# Patient Record
Sex: FEMALE | Race: WHITE | NOT HISPANIC OR LATINO | URBAN - METROPOLITAN AREA
[De-identification: names, ages, dates, MRNs, and addresses within clinical notes are randomized per-mention and may not be internally consistent; named-entity substitution may affect disease eponyms.]

---

## 2017-11-03 ENCOUNTER — EMERGENCY (EMERGENCY)
Facility: HOSPITAL | Age: 72
LOS: 1 days | Discharge: HOME | End: 2017-11-03
Attending: EMERGENCY MEDICINE | Admitting: EMERGENCY MEDICINE
Payer: MEDICARE

## 2017-11-03 VITALS
TEMPERATURE: 98 F | DIASTOLIC BLOOD PRESSURE: 76 MMHG | HEART RATE: 71 BPM | RESPIRATION RATE: 16 BRPM | SYSTOLIC BLOOD PRESSURE: 136 MMHG | OXYGEN SATURATION: 99 %

## 2017-11-03 VITALS
OXYGEN SATURATION: 99 % | TEMPERATURE: 98 F | RESPIRATION RATE: 18 BRPM | DIASTOLIC BLOOD PRESSURE: 84 MMHG | HEART RATE: 85 BPM | SYSTOLIC BLOOD PRESSURE: 147 MMHG

## 2017-11-03 DIAGNOSIS — Z90.49 ACQUIRED ABSENCE OF OTHER SPECIFIED PARTS OF DIGESTIVE TRACT: Chronic | ICD-10-CM

## 2017-11-03 DIAGNOSIS — Z98.890 OTHER SPECIFIED POSTPROCEDURAL STATES: Chronic | ICD-10-CM

## 2017-11-03 LAB
ALBUMIN SERPL ELPH-MCNC: 3.9 G/DL — SIGNIFICANT CHANGE UP (ref 3.4–5)
ALP SERPL-CCNC: 77 U/L — SIGNIFICANT CHANGE UP (ref 40–120)
ALT FLD-CCNC: 19 U/L — SIGNIFICANT CHANGE UP (ref 12–42)
ANION GAP SERPL CALC-SCNC: 7 MMOL/L — LOW (ref 9–16)
APPEARANCE UR: CLEAR — SIGNIFICANT CHANGE UP
AST SERPL-CCNC: 28 U/L — SIGNIFICANT CHANGE UP (ref 15–37)
BILIRUB SERPL-MCNC: 0.7 MG/DL — SIGNIFICANT CHANGE UP (ref 0.2–1.2)
BILIRUB UR-MCNC: NEGATIVE — SIGNIFICANT CHANGE UP
BUN SERPL-MCNC: 11 MG/DL — SIGNIFICANT CHANGE UP (ref 7–23)
CALCIUM SERPL-MCNC: 8.9 MG/DL — SIGNIFICANT CHANGE UP (ref 8.5–10.5)
CHLORIDE SERPL-SCNC: 104 MMOL/L — SIGNIFICANT CHANGE UP (ref 96–108)
CO2 SERPL-SCNC: 27 MMOL/L — SIGNIFICANT CHANGE UP (ref 22–31)
COLOR SPEC: YELLOW — SIGNIFICANT CHANGE UP
CREAT SERPL-MCNC: 0.73 MG/DL — SIGNIFICANT CHANGE UP (ref 0.5–1.3)
DIFF PNL FLD: (no result)
GLUCOSE SERPL-MCNC: 131 MG/DL — HIGH (ref 70–99)
GLUCOSE UR QL: NEGATIVE — SIGNIFICANT CHANGE UP
HCT VFR BLD CALC: 44 % — SIGNIFICANT CHANGE UP (ref 34.5–45)
HGB BLD-MCNC: 14.6 G/DL — SIGNIFICANT CHANGE UP (ref 11.5–15.5)
KETONES UR-MCNC: 15 MG/DL
LACTATE SERPL-SCNC: 1 MMOL/L — SIGNIFICANT CHANGE UP (ref 0.4–2)
LEUKOCYTE ESTERASE UR-ACNC: NEGATIVE — SIGNIFICANT CHANGE UP
MCHC RBC-ENTMCNC: 30 PG — SIGNIFICANT CHANGE UP (ref 27–34)
MCHC RBC-ENTMCNC: 33.2 G/DL — SIGNIFICANT CHANGE UP (ref 32–36)
MCV RBC AUTO: 90.3 FL — SIGNIFICANT CHANGE UP (ref 80–100)
NITRITE UR-MCNC: NEGATIVE — SIGNIFICANT CHANGE UP
PH UR: 6 — SIGNIFICANT CHANGE UP (ref 5–8)
PLATELET # BLD AUTO: 172 K/UL — SIGNIFICANT CHANGE UP (ref 150–400)
POTASSIUM SERPL-MCNC: 4.1 MMOL/L — SIGNIFICANT CHANGE UP (ref 3.5–5.3)
POTASSIUM SERPL-SCNC: 4.1 MMOL/L — SIGNIFICANT CHANGE UP (ref 3.5–5.3)
PROT SERPL-MCNC: 7.9 G/DL — SIGNIFICANT CHANGE UP (ref 6.4–8.2)
PROT UR-MCNC: (no result) MG/DL
RBC # BLD: 4.87 M/UL — SIGNIFICANT CHANGE UP (ref 3.8–5.2)
RBC # FLD: 13.4 % — SIGNIFICANT CHANGE UP (ref 10.3–16.9)
SODIUM SERPL-SCNC: 138 MMOL/L — SIGNIFICANT CHANGE UP (ref 132–145)
SP GR SPEC: >=1.03 — SIGNIFICANT CHANGE UP (ref 1–1.03)
UROBILINOGEN FLD QL: 0.2 E.U./DL — SIGNIFICANT CHANGE UP
WBC # BLD: 7.2 K/UL — SIGNIFICANT CHANGE UP (ref 3.8–10.5)
WBC # FLD AUTO: 7.2 K/UL — SIGNIFICANT CHANGE UP (ref 3.8–10.5)

## 2017-11-03 PROCEDURE — 99284 EMERGENCY DEPT VISIT MOD MDM: CPT

## 2017-11-03 PROCEDURE — 70450 CT HEAD/BRAIN W/O DYE: CPT | Mod: 26

## 2017-11-03 RX ORDER — KETOROLAC TROMETHAMINE 30 MG/ML
30 SYRINGE (ML) INJECTION ONCE
Qty: 0 | Refills: 0 | Status: DISCONTINUED | OUTPATIENT
Start: 2017-11-03 | End: 2017-11-03

## 2017-11-03 RX ORDER — SODIUM CHLORIDE 9 MG/ML
1000 INJECTION INTRAMUSCULAR; INTRAVENOUS; SUBCUTANEOUS ONCE
Qty: 0 | Refills: 0 | Status: COMPLETED | OUTPATIENT
Start: 2017-11-03 | End: 2017-11-03

## 2017-11-03 RX ORDER — ONDANSETRON 8 MG/1
4 TABLET, FILM COATED ORAL ONCE
Qty: 0 | Refills: 0 | Status: COMPLETED | OUTPATIENT
Start: 2017-11-03 | End: 2017-11-03

## 2017-11-03 RX ADMIN — SODIUM CHLORIDE 2000 MILLILITER(S): 9 INJECTION INTRAMUSCULAR; INTRAVENOUS; SUBCUTANEOUS at 18:41

## 2017-11-03 RX ADMIN — ONDANSETRON 4 MILLIGRAM(S): 8 TABLET, FILM COATED ORAL at 18:41

## 2017-11-03 RX ADMIN — Medication 30 MILLIGRAM(S): at 18:41

## 2017-11-03 NOTE — ED PROVIDER NOTE - OBJECTIVE STATEMENT
70 y/o F w/ PMH pancreatitis(presumed from Demerol use), sleep apnea and recurrent UTIs presents w/ headache that began last night with associated nausea. She states the headache came on abruptly and kept her up all night. Patient states she has a friend who had a headache that presented similarly which was due to a brain aneurysm and was concern, so patient comes in for evaluation. Patient also complains of non-bloody diarrhea with soft stool "for a while," that has been worsening. Denies fever, chills, chest pain, shortness of breath, abdominal pain, vomiting, neck pain, photophobia, loss of vision, weakness in extremities, difficulty speaking or word finding, unsteady gait. 70 y/o F w/ PMH pancreatitis(presumed from Demerol use), sleep apnea and recurrent UTIs presents w/ headache that began last night with associated nausea. She states the headache came on gradually and kept her up all night.  Patient also complains of non-bloody diarrhea with soft stool "for a while," that has been worsening. Denies fever, chills, chest pain, shortness of breath, abdominal pain, vomiting, neck pain, photophobia, loss of vision, weakness in extremities, difficulty speaking or word finding, unsteady gait.  Headache is not worst of life and did not come on suddenly or at peak intensity.  No neuro complaints.  NO recent travel/ill contacts.  No neck pain/stiffness.

## 2017-11-03 NOTE — ED PROVIDER NOTE - CHPI ED SYMPTOMS NEG
no change in level of consciousness/no fever/no blurred vision/no weakness/no loss of consciousness/no confusion/no vomiting

## 2017-11-03 NOTE — ED PROVIDER NOTE - NEUROLOGICAL, MLM
Alert and oriented, no focal deficits, no motor or sensory deficits. CNs intact. Normal Romberg. Normal finger to nose. No pronator drift. Normal rapi alternating movements/heal to shin. Sensation intact to all extremities. 5/5 strength to all extremities. Alert and oriented, no focal deficits, no motor or sensory deficits. CNs intact. Normal Romberg. Normal finger to nose. No pronator drift.  Sensation intact to all extremities. 5/5 strength to all extremities.

## 2017-11-03 NOTE — ED ADULT TRIAGE NOTE - CHIEF COMPLAINT QUOTE
Pt has diarrhea for the last week. States she also has a headache. Pt eating crackers in triage. Pt states she never gets headaches and her BP is higher then normal.

## 2017-11-03 NOTE — ED PROVIDER NOTE - PROGRESS NOTE DETAILS
Marked improvement in condition after fluids/medication.  No further headache, no dizziness, no nausea.  Patient is able to tolerate fluids/food without difficulty or nausea.  Neuro exam remains normal.  Patient states she feels better and is ready to go.  Issues with loose stool are longstanding - no fever, no blood in stool and no watery stool.  Patient has a gastroenterologist and will follow up there.

## 2017-11-09 DIAGNOSIS — R51 HEADACHE: ICD-10-CM

## 2017-11-09 DIAGNOSIS — R19.7 DIARRHEA, UNSPECIFIED: ICD-10-CM

## 2017-11-09 DIAGNOSIS — R11.0 NAUSEA: ICD-10-CM

## 2017-11-09 DIAGNOSIS — Z87.891 PERSONAL HISTORY OF NICOTINE DEPENDENCE: ICD-10-CM

## 2017-11-12 ENCOUNTER — TRANSCRIPTION ENCOUNTER (OUTPATIENT)
Age: 72
End: 2017-11-12

## 2018-01-10 ENCOUNTER — EMERGENCY (EMERGENCY)
Facility: HOSPITAL | Age: 73
LOS: 1 days | Discharge: HOME | End: 2018-01-10
Admitting: EMERGENCY MEDICINE
Payer: MEDICARE

## 2018-01-10 VITALS
RESPIRATION RATE: 18 BRPM | SYSTOLIC BLOOD PRESSURE: 125 MMHG | OXYGEN SATURATION: 100 % | HEART RATE: 75 BPM | DIASTOLIC BLOOD PRESSURE: 77 MMHG | TEMPERATURE: 98 F

## 2018-01-10 VITALS
DIASTOLIC BLOOD PRESSURE: 79 MMHG | OXYGEN SATURATION: 99 % | SYSTOLIC BLOOD PRESSURE: 127 MMHG | HEART RATE: 68 BPM | RESPIRATION RATE: 18 BRPM | TEMPERATURE: 98 F

## 2018-01-10 DIAGNOSIS — R10.9 UNSPECIFIED ABDOMINAL PAIN: ICD-10-CM

## 2018-01-10 DIAGNOSIS — R35.0 FREQUENCY OF MICTURITION: ICD-10-CM

## 2018-01-10 DIAGNOSIS — Z88.8 ALLERGY STATUS TO OTHER DRUGS, MEDICAMENTS AND BIOLOGICAL SUBSTANCES STATUS: ICD-10-CM

## 2018-01-10 DIAGNOSIS — Z98.890 OTHER SPECIFIED POSTPROCEDURAL STATES: Chronic | ICD-10-CM

## 2018-01-10 DIAGNOSIS — Z90.49 ACQUIRED ABSENCE OF OTHER SPECIFIED PARTS OF DIGESTIVE TRACT: Chronic | ICD-10-CM

## 2018-01-10 LAB
ALBUMIN SERPL ELPH-MCNC: 3.4 G/DL — SIGNIFICANT CHANGE UP (ref 3.4–5)
ALP SERPL-CCNC: 103 U/L — SIGNIFICANT CHANGE UP (ref 40–120)
ALT FLD-CCNC: 17 U/L — SIGNIFICANT CHANGE UP (ref 12–42)
ANION GAP SERPL CALC-SCNC: 7 MMOL/L — LOW (ref 9–16)
APPEARANCE UR: CLEAR — SIGNIFICANT CHANGE UP
AST SERPL-CCNC: 17 U/L — SIGNIFICANT CHANGE UP (ref 15–37)
BASOPHILS NFR BLD AUTO: 0.4 % — SIGNIFICANT CHANGE UP (ref 0–2)
BILIRUB SERPL-MCNC: 0.2 MG/DL — SIGNIFICANT CHANGE UP (ref 0.2–1.2)
BILIRUB UR-MCNC: NEGATIVE — SIGNIFICANT CHANGE UP
BUN SERPL-MCNC: 12 MG/DL — SIGNIFICANT CHANGE UP (ref 7–23)
CALCIUM SERPL-MCNC: 8.7 MG/DL — SIGNIFICANT CHANGE UP (ref 8.5–10.5)
CHLORIDE SERPL-SCNC: 104 MMOL/L — SIGNIFICANT CHANGE UP (ref 96–108)
CO2 SERPL-SCNC: 27 MMOL/L — SIGNIFICANT CHANGE UP (ref 22–31)
COLOR SPEC: YELLOW — SIGNIFICANT CHANGE UP
CREAT SERPL-MCNC: 1.01 MG/DL — SIGNIFICANT CHANGE UP (ref 0.5–1.3)
DIFF PNL FLD: (no result)
EOSINOPHIL NFR BLD AUTO: 4 % — SIGNIFICANT CHANGE UP (ref 0–6)
GLUCOSE SERPL-MCNC: 89 MG/DL — SIGNIFICANT CHANGE UP (ref 70–99)
GLUCOSE UR QL: NEGATIVE — SIGNIFICANT CHANGE UP
HCT VFR BLD CALC: 41.5 % — SIGNIFICANT CHANGE UP (ref 34.5–45)
HGB BLD-MCNC: 13.6 G/DL — SIGNIFICANT CHANGE UP (ref 11.5–15.5)
IMM GRANULOCYTES NFR BLD AUTO: 0.2 % — SIGNIFICANT CHANGE UP (ref 0–1.5)
KETONES UR-MCNC: NEGATIVE — SIGNIFICANT CHANGE UP
LEUKOCYTE ESTERASE UR-ACNC: NEGATIVE — SIGNIFICANT CHANGE UP
LYMPHOCYTES # BLD AUTO: 44.5 % — HIGH (ref 13–44)
MAGNESIUM SERPL-MCNC: 2.1 MG/DL — SIGNIFICANT CHANGE UP (ref 1.6–2.6)
MCHC RBC-ENTMCNC: 29.6 PG — SIGNIFICANT CHANGE UP (ref 27–34)
MCHC RBC-ENTMCNC: 32.8 G/DL — SIGNIFICANT CHANGE UP (ref 32–36)
MCV RBC AUTO: 90.2 FL — SIGNIFICANT CHANGE UP (ref 80–100)
MONOCYTES NFR BLD AUTO: 10.9 % — SIGNIFICANT CHANGE UP (ref 2–14)
NEUTROPHILS NFR BLD AUTO: 40 % — LOW (ref 43–77)
NITRITE UR-MCNC: NEGATIVE — SIGNIFICANT CHANGE UP
PH UR: 6 — SIGNIFICANT CHANGE UP (ref 5–8)
PLATELET # BLD AUTO: 171 K/UL — SIGNIFICANT CHANGE UP (ref 150–400)
POTASSIUM SERPL-MCNC: 4.4 MMOL/L — SIGNIFICANT CHANGE UP (ref 3.5–5.3)
POTASSIUM SERPL-SCNC: 4.4 MMOL/L — SIGNIFICANT CHANGE UP (ref 3.5–5.3)
PROT SERPL-MCNC: 7.3 G/DL — SIGNIFICANT CHANGE UP (ref 6.4–8.2)
PROT UR-MCNC: NEGATIVE MG/DL — SIGNIFICANT CHANGE UP
RBC # BLD: 4.6 M/UL — SIGNIFICANT CHANGE UP (ref 3.8–5.2)
RBC # FLD: 13.6 % — SIGNIFICANT CHANGE UP (ref 10.3–16.9)
SODIUM SERPL-SCNC: 138 MMOL/L — SIGNIFICANT CHANGE UP (ref 132–145)
SP GR SPEC: 1.02 — SIGNIFICANT CHANGE UP (ref 1–1.03)
UROBILINOGEN FLD QL: 0.2 E.U./DL — SIGNIFICANT CHANGE UP
WBC # BLD: 4.7 K/UL — SIGNIFICANT CHANGE UP (ref 3.8–10.5)
WBC # FLD AUTO: 4.7 K/UL — SIGNIFICANT CHANGE UP (ref 3.8–10.5)

## 2018-01-10 PROCEDURE — 74176 CT ABD & PELVIS W/O CONTRAST: CPT | Mod: 26

## 2018-01-10 PROCEDURE — 71046 X-RAY EXAM CHEST 2 VIEWS: CPT | Mod: 26

## 2018-01-10 PROCEDURE — 99284 EMERGENCY DEPT VISIT MOD MDM: CPT

## 2018-01-10 RX ORDER — SODIUM CHLORIDE 9 MG/ML
1000 INJECTION INTRAMUSCULAR; INTRAVENOUS; SUBCUTANEOUS ONCE
Qty: 0 | Refills: 0 | Status: COMPLETED | OUTPATIENT
Start: 2018-01-10 | End: 2018-01-10

## 2018-01-10 RX ADMIN — SODIUM CHLORIDE 2000 MILLILITER(S): 9 INJECTION INTRAMUSCULAR; INTRAVENOUS; SUBCUTANEOUS at 21:27

## 2018-01-10 NOTE — ED ADULT TRIAGE NOTE - CHIEF COMPLAINT QUOTE
bilateral flank pain radiating towards the back. states "my kidney infection is getting worst"  Patient coughing in triage. but refused to wear a mask.

## 2018-01-10 NOTE — ED PROVIDER NOTE - MEDICAL DECISION MAKING DETAILS
flank pain with intermittent urinary symptoms, VSS and pt afebrile. currently on day 2 of bactrim.  ct shows no renal abnormalities, and labs otherwise unremarkable.  advised to finish bactrim; pmd and urology f/u. also noted on ct were lumbar spine age appropriate changes and pt with non focal exam but possible also contributing to back pain.  return precautions discussed

## 2018-01-10 NOTE — ED PROVIDER NOTE - OBJECTIVE STATEMENT
Pt is 73 yo female with pmhx of pancreatitis, sleep apnea, UTI's, and hx of appendectomy presents with bilateral flank pain x 3-4 days.  Pt with recent uti and treatment on and off several different antibiotics over the past month with conflicting info from the urgent care where she was treated.  Pt reports at one point had 1 day of hematuria >1 week ago and also had some urinary frequency and mild "foggy feeling" which she has associated as a symptom of her utis in the past.  Pt denies any fevers, chills, n/v/d, abdominal pain.  Pt with mild cough x 1 week.

## 2018-01-13 LAB
-  AMPICILLIN: SIGNIFICANT CHANGE UP
-  CIPROFLOXACIN: SIGNIFICANT CHANGE UP
-  NITROFURANTOIN: SIGNIFICANT CHANGE UP
-  TETRACYCLINE: SIGNIFICANT CHANGE UP
-  VANCOMYCIN: SIGNIFICANT CHANGE UP
CULTURE RESULTS: SIGNIFICANT CHANGE UP
METHOD TYPE: SIGNIFICANT CHANGE UP
ORGANISM # SPEC MICROSCOPIC CNT: SIGNIFICANT CHANGE UP
ORGANISM # SPEC MICROSCOPIC CNT: SIGNIFICANT CHANGE UP
SPECIMEN SOURCE: SIGNIFICANT CHANGE UP

## 2018-01-28 ENCOUNTER — EMERGENCY (EMERGENCY)
Facility: HOSPITAL | Age: 73
LOS: 1 days | Discharge: HOME | End: 2018-01-28
Admitting: EMERGENCY MEDICINE
Payer: MEDICARE

## 2018-01-28 VITALS
SYSTOLIC BLOOD PRESSURE: 137 MMHG | RESPIRATION RATE: 18 BRPM | DIASTOLIC BLOOD PRESSURE: 75 MMHG | HEART RATE: 93 BPM | TEMPERATURE: 98 F | OXYGEN SATURATION: 97 %

## 2018-01-28 VITALS
DIASTOLIC BLOOD PRESSURE: 67 MMHG | HEART RATE: 90 BPM | RESPIRATION RATE: 16 BRPM | OXYGEN SATURATION: 98 % | SYSTOLIC BLOOD PRESSURE: 131 MMHG

## 2018-01-28 DIAGNOSIS — Z98.890 OTHER SPECIFIED POSTPROCEDURAL STATES: Chronic | ICD-10-CM

## 2018-01-28 DIAGNOSIS — Z90.49 ACQUIRED ABSENCE OF OTHER SPECIFIED PARTS OF DIGESTIVE TRACT: Chronic | ICD-10-CM

## 2018-01-28 LAB
ALBUMIN SERPL ELPH-MCNC: 3.8 G/DL — SIGNIFICANT CHANGE UP (ref 3.4–5)
ALP SERPL-CCNC: 83 U/L — SIGNIFICANT CHANGE UP (ref 40–120)
ALT FLD-CCNC: 20 U/L — SIGNIFICANT CHANGE UP (ref 12–42)
ANION GAP SERPL CALC-SCNC: 8 MMOL/L — LOW (ref 9–16)
APPEARANCE UR: CLEAR — SIGNIFICANT CHANGE UP
AST SERPL-CCNC: 21 U/L — SIGNIFICANT CHANGE UP (ref 15–37)
BASOPHILS NFR BLD AUTO: 0.3 % — SIGNIFICANT CHANGE UP (ref 0–2)
BILIRUB SERPL-MCNC: 0.5 MG/DL — SIGNIFICANT CHANGE UP (ref 0.2–1.2)
BILIRUB UR-MCNC: NEGATIVE — SIGNIFICANT CHANGE UP
BUN SERPL-MCNC: 13 MG/DL — SIGNIFICANT CHANGE UP (ref 7–23)
CALCIUM SERPL-MCNC: 8.8 MG/DL — SIGNIFICANT CHANGE UP (ref 8.5–10.5)
CHLORIDE SERPL-SCNC: 104 MMOL/L — SIGNIFICANT CHANGE UP (ref 96–108)
CO2 SERPL-SCNC: 27 MMOL/L — SIGNIFICANT CHANGE UP (ref 22–31)
COLOR SPEC: YELLOW — SIGNIFICANT CHANGE UP
CREAT SERPL-MCNC: 0.95 MG/DL — SIGNIFICANT CHANGE UP (ref 0.5–1.3)
DIFF PNL FLD: (no result)
EOSINOPHIL NFR BLD AUTO: 1.5 % — SIGNIFICANT CHANGE UP (ref 0–6)
GLUCOSE SERPL-MCNC: 114 MG/DL — HIGH (ref 70–99)
GLUCOSE UR QL: NEGATIVE — SIGNIFICANT CHANGE UP
HCT VFR BLD CALC: 42.4 % — SIGNIFICANT CHANGE UP (ref 34.5–45)
HGB BLD-MCNC: 14.1 G/DL — SIGNIFICANT CHANGE UP (ref 11.5–15.5)
IMM GRANULOCYTES NFR BLD AUTO: 0.3 % — SIGNIFICANT CHANGE UP (ref 0–1.5)
KETONES UR-MCNC: 40 MG/DL
LEUKOCYTE ESTERASE UR-ACNC: (no result)
LIDOCAIN IGE QN: 288 U/L — SIGNIFICANT CHANGE UP (ref 73–393)
LYMPHOCYTES # BLD AUTO: 11.3 % — LOW (ref 13–44)
MAGNESIUM SERPL-MCNC: 1.9 MG/DL — SIGNIFICANT CHANGE UP (ref 1.6–2.6)
MCHC RBC-ENTMCNC: 29.8 PG — SIGNIFICANT CHANGE UP (ref 27–34)
MCHC RBC-ENTMCNC: 33.3 G/DL — SIGNIFICANT CHANGE UP (ref 32–36)
MCV RBC AUTO: 89.6 FL — SIGNIFICANT CHANGE UP (ref 80–100)
MONOCYTES NFR BLD AUTO: 5.4 % — SIGNIFICANT CHANGE UP (ref 2–14)
NEUTROPHILS NFR BLD AUTO: 81.2 % — HIGH (ref 43–77)
NITRITE UR-MCNC: NEGATIVE — SIGNIFICANT CHANGE UP
PH UR: 5.5 — SIGNIFICANT CHANGE UP (ref 5–8)
PLATELET # BLD AUTO: 189 K/UL — SIGNIFICANT CHANGE UP (ref 150–400)
POTASSIUM SERPL-MCNC: 4 MMOL/L — SIGNIFICANT CHANGE UP (ref 3.5–5.3)
POTASSIUM SERPL-SCNC: 4 MMOL/L — SIGNIFICANT CHANGE UP (ref 3.5–5.3)
PROT SERPL-MCNC: 7.3 G/DL — SIGNIFICANT CHANGE UP (ref 6.4–8.2)
PROT UR-MCNC: NEGATIVE MG/DL — SIGNIFICANT CHANGE UP
RBC # BLD: 4.73 M/UL — SIGNIFICANT CHANGE UP (ref 3.8–5.2)
RBC # FLD: 13.4 % — SIGNIFICANT CHANGE UP (ref 10.3–16.9)
SODIUM SERPL-SCNC: 139 MMOL/L — SIGNIFICANT CHANGE UP (ref 132–145)
SP GR SPEC: 1.01 — SIGNIFICANT CHANGE UP (ref 1–1.03)
UROBILINOGEN FLD QL: 0.2 E.U./DL — SIGNIFICANT CHANGE UP
WBC # BLD: 7.8 K/UL — SIGNIFICANT CHANGE UP (ref 3.8–10.5)
WBC # FLD AUTO: 7.8 K/UL — SIGNIFICANT CHANGE UP (ref 3.8–10.5)

## 2018-01-28 PROCEDURE — 99284 EMERGENCY DEPT VISIT MOD MDM: CPT

## 2018-01-28 RX ORDER — ONDANSETRON 8 MG/1
8 TABLET, FILM COATED ORAL ONCE
Qty: 0 | Refills: 0 | Status: COMPLETED | OUTPATIENT
Start: 2018-01-28 | End: 2018-01-28

## 2018-01-28 RX ORDER — TRAMADOL HYDROCHLORIDE 50 MG/1
50 TABLET ORAL ONCE
Qty: 0 | Refills: 0 | Status: DISCONTINUED | OUTPATIENT
Start: 2018-01-28 | End: 2018-01-28

## 2018-01-28 RX ORDER — LOPERAMIDE HCL 2 MG
4 TABLET ORAL ONCE
Qty: 0 | Refills: 0 | Status: COMPLETED | OUTPATIENT
Start: 2018-01-28 | End: 2018-01-28

## 2018-01-28 RX ORDER — SODIUM CHLORIDE 9 MG/ML
1000 INJECTION INTRAMUSCULAR; INTRAVENOUS; SUBCUTANEOUS ONCE
Qty: 0 | Refills: 0 | Status: COMPLETED | OUTPATIENT
Start: 2018-01-28 | End: 2018-01-28

## 2018-01-28 RX ORDER — ONDANSETRON 8 MG/1
1 TABLET, FILM COATED ORAL
Qty: 21 | Refills: 0
Start: 2018-01-28 | End: 2018-02-03

## 2018-01-28 RX ADMIN — TRAMADOL HYDROCHLORIDE 50 MILLIGRAM(S): 50 TABLET ORAL at 21:42

## 2018-01-28 RX ADMIN — Medication 20 MILLIGRAM(S): at 21:42

## 2018-01-28 RX ADMIN — SODIUM CHLORIDE 1000 MILLILITER(S): 9 INJECTION INTRAMUSCULAR; INTRAVENOUS; SUBCUTANEOUS at 19:40

## 2018-01-28 RX ADMIN — ONDANSETRON 8 MILLIGRAM(S): 8 TABLET, FILM COATED ORAL at 19:32

## 2018-01-28 RX ADMIN — Medication 4 MILLIGRAM(S): at 19:32

## 2018-01-28 RX ADMIN — ONDANSETRON 8 MILLIGRAM(S): 8 TABLET, FILM COATED ORAL at 16:22

## 2018-01-28 RX ADMIN — SODIUM CHLORIDE 1000 MILLILITER(S): 9 INJECTION INTRAMUSCULAR; INTRAVENOUS; SUBCUTANEOUS at 16:10

## 2018-01-28 NOTE — ED ADULT NURSE NOTE - OBJECTIVE STATEMENT
pt c/o explosive diarrhea starting this morning. reports being on multiple rounds of ABX (Macrobid, bactrim, Macrobid) for recurrent UTIs. States she has been having soft stools for several weeks but diarrhea started this morning. Reports one episode of vomiting earlier. Denies fever, chills, abdominal pain, or dysuria at the moment.

## 2018-01-28 NOTE — ED PROVIDER NOTE - MEDICAL DECISION MAKING DETAILS
Pt presents with multiple episodes of diarrhea today and nausea. Will give IV fluids, conduct labs, including UA and stool sample and reassess. Pt presents with multiple episodes of diarrhea today and nausea. had been on a 3 week coarse of abx finishe 3 days ago. Will give IV fluids, conduct labs, including UA and stool sample and reassess.

## 2018-01-28 NOTE — ED PROVIDER NOTE - OBJECTIVE STATEMENT
73 yo F with Hx of hypothyroidism, pacreatitis, and appendectomy presents c/o multiple episodes of diarrhea today. Pt states has been on Abx for 2 weeks for UTI and reports today having about 10 episodes of diarrhea, as well as an episode of N/V, with mild nausea still present. Denies fever, chills, or other complaints. Pt reports lack of appetite as of today. 73 yo F with Hx of hypothyroidism, and appendectomy presents c/o multiple episodes of diarrhea today. Pt states has been on Abx for 3 weeks for UTI finished 3 days ago and reports today having about 10 episodes of diarrhea, as well as an episode of N/V, with mild nausea still present. Denies fever, chills, or other complaints. Pt reports lack of appetite as of today.

## 2018-01-29 LAB
C DIFF BY PCR RESULT: SIGNIFICANT CHANGE UP
C DIFF TOX GENS STL QL NAA+PROBE: SIGNIFICANT CHANGE UP

## 2018-02-01 DIAGNOSIS — K52.9 NONINFECTIVE GASTROENTERITIS AND COLITIS, UNSPECIFIED: ICD-10-CM

## 2018-02-01 DIAGNOSIS — Z88.8 ALLERGY STATUS TO OTHER DRUGS, MEDICAMENTS AND BIOLOGICAL SUBSTANCES STATUS: ICD-10-CM

## 2019-04-03 PROBLEM — K85.90 ACUTE PANCREATITIS WITHOUT NECROSIS OR INFECTION, UNSPECIFIED: Chronic | Status: ACTIVE | Noted: 2017-11-03

## 2019-04-03 PROBLEM — N39.0 URINARY TRACT INFECTION, SITE NOT SPECIFIED: Chronic | Status: ACTIVE | Noted: 2017-11-03

## 2019-04-03 PROBLEM — G47.30 SLEEP APNEA, UNSPECIFIED: Chronic | Status: ACTIVE | Noted: 2017-11-03

## 2019-04-05 ENCOUNTER — OUTPATIENT (OUTPATIENT)
Dept: OUTPATIENT SERVICES | Facility: HOSPITAL | Age: 74
LOS: 1 days | End: 2019-04-05

## 2019-04-05 ENCOUNTER — APPOINTMENT (OUTPATIENT)
Dept: RADIOLOGY | Facility: CLINIC | Age: 74
End: 2019-04-05
Payer: MEDICARE

## 2019-04-05 DIAGNOSIS — Z90.49 ACQUIRED ABSENCE OF OTHER SPECIFIED PARTS OF DIGESTIVE TRACT: Chronic | ICD-10-CM

## 2019-04-05 DIAGNOSIS — Z98.890 OTHER SPECIFIED POSTPROCEDURAL STATES: Chronic | ICD-10-CM

## 2019-04-05 PROCEDURE — 77080 DXA BONE DENSITY AXIAL: CPT | Mod: 26

## 2019-04-12 ENCOUNTER — TRANSCRIPTION ENCOUNTER (OUTPATIENT)
Age: 74
End: 2019-04-12

## 2019-04-16 ENCOUNTER — APPOINTMENT (OUTPATIENT)
Dept: RADIOLOGY | Facility: CLINIC | Age: 74
End: 2019-04-16
Payer: MEDICARE

## 2019-04-16 ENCOUNTER — OUTPATIENT (OUTPATIENT)
Dept: OUTPATIENT SERVICES | Facility: HOSPITAL | Age: 74
LOS: 1 days | End: 2019-04-16
Payer: MEDICARE

## 2019-04-16 ENCOUNTER — APPOINTMENT (OUTPATIENT)
Dept: CT IMAGING | Facility: CLINIC | Age: 74
End: 2019-04-16
Payer: MEDICARE

## 2019-04-16 ENCOUNTER — APPOINTMENT (OUTPATIENT)
Dept: ULTRASOUND IMAGING | Facility: CLINIC | Age: 74
End: 2019-04-16
Payer: MEDICARE

## 2019-04-16 DIAGNOSIS — Z98.890 OTHER SPECIFIED POSTPROCEDURAL STATES: Chronic | ICD-10-CM

## 2019-04-16 DIAGNOSIS — Z90.49 ACQUIRED ABSENCE OF OTHER SPECIFIED PARTS OF DIGESTIVE TRACT: Chronic | ICD-10-CM

## 2019-04-16 PROCEDURE — 72040 X-RAY EXAM NECK SPINE 2-3 VW: CPT | Mod: 26

## 2019-04-16 PROCEDURE — 75574 CT ANGIO HRT W/3D IMAGE: CPT | Mod: 26

## 2019-04-16 PROCEDURE — 76536 US EXAM OF HEAD AND NECK: CPT

## 2019-04-16 PROCEDURE — 73560 X-RAY EXAM OF KNEE 1 OR 2: CPT

## 2019-04-16 PROCEDURE — 72100 X-RAY EXAM L-S SPINE 2/3 VWS: CPT | Mod: 26

## 2019-04-16 PROCEDURE — 76536 US EXAM OF HEAD AND NECK: CPT | Mod: 26

## 2019-04-16 PROCEDURE — 75574 CT ANGIO HRT W/3D IMAGE: CPT

## 2019-04-16 PROCEDURE — 72040 X-RAY EXAM NECK SPINE 2-3 VW: CPT

## 2019-04-16 PROCEDURE — 73560 X-RAY EXAM OF KNEE 1 OR 2: CPT | Mod: 26,LT

## 2019-04-16 PROCEDURE — 72100 X-RAY EXAM L-S SPINE 2/3 VWS: CPT

## 2019-04-26 ENCOUNTER — OUTPATIENT (OUTPATIENT)
Dept: OUTPATIENT SERVICES | Facility: HOSPITAL | Age: 74
LOS: 1 days | End: 2019-04-26

## 2019-04-26 ENCOUNTER — APPOINTMENT (OUTPATIENT)
Dept: MRI IMAGING | Facility: CLINIC | Age: 74
End: 2019-04-26
Payer: MEDICARE

## 2019-04-26 DIAGNOSIS — Z90.49 ACQUIRED ABSENCE OF OTHER SPECIFIED PARTS OF DIGESTIVE TRACT: Chronic | ICD-10-CM

## 2019-04-26 DIAGNOSIS — Z98.890 OTHER SPECIFIED POSTPROCEDURAL STATES: Chronic | ICD-10-CM

## 2019-04-26 PROCEDURE — 70551 MRI BRAIN STEM W/O DYE: CPT | Mod: 26

## 2019-05-07 ENCOUNTER — APPOINTMENT (OUTPATIENT)
Dept: OTOLARYNGOLOGY | Facility: CLINIC | Age: 74
End: 2019-05-07
Payer: MEDICARE

## 2019-05-07 VITALS
BODY MASS INDEX: 21.68 KG/M2 | HEART RATE: 69 BPM | DIASTOLIC BLOOD PRESSURE: 72 MMHG | OXYGEN SATURATION: 98 % | HEIGHT: 64 IN | TEMPERATURE: 97.9 F | SYSTOLIC BLOOD PRESSURE: 117 MMHG | WEIGHT: 127 LBS

## 2019-05-07 DIAGNOSIS — Z78.9 OTHER SPECIFIED HEALTH STATUS: ICD-10-CM

## 2019-05-07 DIAGNOSIS — Z87.891 PERSONAL HISTORY OF NICOTINE DEPENDENCE: ICD-10-CM

## 2019-05-07 DIAGNOSIS — Z82.2 FAMILY HISTORY OF DEAFNESS AND HEARING LOSS: ICD-10-CM

## 2019-05-07 PROCEDURE — 92557 COMPREHENSIVE HEARING TEST: CPT

## 2019-05-07 PROCEDURE — 31575 DIAGNOSTIC LARYNGOSCOPY: CPT

## 2019-05-07 PROCEDURE — 99204 OFFICE O/P NEW MOD 45 MIN: CPT | Mod: 25

## 2019-05-07 PROCEDURE — 69210 REMOVE IMPACTED EAR WAX UNI: CPT

## 2019-05-07 PROCEDURE — 92550 TYMPANOMETRY & REFLEX THRESH: CPT

## 2019-05-07 RX ORDER — THYROID, PORCINE 30 MG/1
30 TABLET ORAL
Refills: 0 | Status: ACTIVE | COMMUNITY

## 2019-05-07 NOTE — PHYSICAL EXAM
[Binocular Microscopic Exam] : Binocular microscopic exam was performed [Removed] : palatine tonsils previously removed [Laryngoscopy Performed] : laryngoscopy was performed, see procedure section for findings [Normal] : no rashes [FreeTextEntry8] : cerumen impaction removed with a hook [FreeTextEntry9] : cerumen impaction removed with a hook

## 2019-05-07 NOTE — ASSESSMENT
[FreeTextEntry1] : Reviewed reflux precautions and provided the patient with the corresponding educational handout.\par Discussed allergen mitigation and provided the patient with the corresponding educational handout.\par Trial ranitidine & RTC 6 wks.

## 2019-05-07 NOTE — PROCEDURE
[de-identified] : Indication: requirement for exam not possible via anterior examination; LPR\par After verbal consent and the administration of an aerosolized phenylephrine/lidocaine mix, examination was performed with a flexible endoscope placed through the nose. Findings:\par Nasopharynx: unremarkable\par Soft palate, lateral and posterior pharyngeal walls: unremarkable\par Base of tongue & lingual tonsil: minimal retrodisplacement\par Vallecula: unremarkable\par Epiglottis: unremarkable\par Piriform sinuses and pharyngoesophageal junction: unremarkable\par Arytenoids and AE folds: severe interarytenoid edema\par Ventricle and false vocal folds: edematous\par True vocal folds: Poorly vis d/t SG hooding and gagging, but mobile bilat\par Other findings: none

## 2019-05-07 NOTE — HISTORY OF PRESENT ILLNESS
[de-identified] : Told by her audiologist that she has cerumen impactions. Denies qtips. \par Longstanding hearing loss thought to be somewhat hereditary & uses bilateral BTE aids. Last audio years ago. Denies tinnitus, pain, or drainage. \par Seasonal allergies that she doesn't treat. Prior allergy testing diffusely positive, but she declined AIT. Prior attempts to use nasal steroids were only for short periods of time. \par Has been diagnosed with LPR which she doesn't really treat, though she follows some reflux precautions. Ongoing clearing and globus. 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

## 2019-05-13 ENCOUNTER — APPOINTMENT (OUTPATIENT)
Dept: ORTHOPEDIC SURGERY | Facility: CLINIC | Age: 74
End: 2019-05-13
Payer: MEDICARE

## 2019-05-13 VITALS — WEIGHT: 129 LBS | RESPIRATION RATE: 16 BRPM | BODY MASS INDEX: 22.02 KG/M2 | HEIGHT: 64 IN

## 2019-05-13 PROCEDURE — 28470 CLTX METATARSAL FX WO MNP EA: CPT | Mod: T9

## 2019-05-13 PROCEDURE — 99204 OFFICE O/P NEW MOD 45 MIN: CPT | Mod: 57

## 2019-05-21 ENCOUNTER — EMERGENCY (EMERGENCY)
Facility: HOSPITAL | Age: 74
LOS: 1 days | Discharge: HOME | End: 2019-05-21
Admitting: EMERGENCY MEDICINE
Payer: MEDICARE

## 2019-05-21 VITALS
HEART RATE: 79 BPM | OXYGEN SATURATION: 98 % | WEIGHT: 130.07 LBS | RESPIRATION RATE: 16 BRPM | TEMPERATURE: 98 F | DIASTOLIC BLOOD PRESSURE: 86 MMHG | SYSTOLIC BLOOD PRESSURE: 134 MMHG

## 2019-05-21 DIAGNOSIS — Z90.49 ACQUIRED ABSENCE OF OTHER SPECIFIED PARTS OF DIGESTIVE TRACT: Chronic | ICD-10-CM

## 2019-05-21 DIAGNOSIS — Z98.890 OTHER SPECIFIED POSTPROCEDURAL STATES: Chronic | ICD-10-CM

## 2019-05-21 PROCEDURE — 73630 X-RAY EXAM OF FOOT: CPT | Mod: 26,RT

## 2019-05-21 PROCEDURE — 99284 EMERGENCY DEPT VISIT MOD MDM: CPT

## 2019-05-21 NOTE — ED PROVIDER NOTE - MUSCULOSKELETAL MINIMAL EXAM
Minimal R foot TTP, no edema or erythema.  2+ pedal pulses, ROM intact.  brisk cap refill.  FroM intact.  Distal sensation intact.

## 2019-05-21 NOTE — ED PROVIDER NOTE - CLINICAL SUMMARY MEDICAL DECISION MAKING FREE TEXT BOX
74 y/o F presents to ED c/o increased pain to R foot.  Pt has knonw 5th metatarsal fracture.  Today's xray wet read compared to prior with no significant change.  Minimal swelling- no concern for compartment syndrome. Pt advised to YOLIE and f/u with Dr. Bobby.
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
## 2019-05-24 DIAGNOSIS — S92.355A NONDISPLACED FRACTURE OF FIFTH METATARSAL BONE, LEFT FOOT, INITIAL ENCOUNTER FOR CLOSED FRACTURE: ICD-10-CM

## 2019-05-24 DIAGNOSIS — M79.671 PAIN IN RIGHT FOOT: ICD-10-CM
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
## 2019-06-04 ENCOUNTER — FORM ENCOUNTER (OUTPATIENT)
Age: 74
End: 2019-06-04

## 2019-06-05 ENCOUNTER — APPOINTMENT (OUTPATIENT)
Dept: RADIOLOGY | Facility: CLINIC | Age: 74
End: 2019-06-05

## 2019-06-05 ENCOUNTER — OUTPATIENT (OUTPATIENT)
Dept: OUTPATIENT SERVICES | Facility: HOSPITAL | Age: 74
LOS: 1 days | End: 2019-06-05
Payer: MEDICARE

## 2019-06-05 ENCOUNTER — APPOINTMENT (OUTPATIENT)
Dept: ORTHOPEDIC SURGERY | Facility: CLINIC | Age: 74
End: 2019-06-05
Payer: MEDICARE

## 2019-06-05 DIAGNOSIS — M17.12 UNILATERAL PRIMARY OSTEOARTHRITIS, LEFT KNEE: ICD-10-CM

## 2019-06-05 DIAGNOSIS — Z90.49 ACQUIRED ABSENCE OF OTHER SPECIFIED PARTS OF DIGESTIVE TRACT: Chronic | ICD-10-CM

## 2019-06-05 DIAGNOSIS — M23.8X2 OTHER INTERNAL DERANGEMENTS OF LEFT KNEE: ICD-10-CM

## 2019-06-05 DIAGNOSIS — Z98.890 OTHER SPECIFIED POSTPROCEDURAL STATES: Chronic | ICD-10-CM

## 2019-06-05 PROCEDURE — 73610 X-RAY EXAM OF ANKLE: CPT

## 2019-06-05 PROCEDURE — 73564 X-RAY EXAM KNEE 4 OR MORE: CPT

## 2019-06-05 PROCEDURE — 73630 X-RAY EXAM OF FOOT: CPT | Mod: 26,50

## 2019-06-05 PROCEDURE — 99213 OFFICE O/P EST LOW 20 MIN: CPT | Mod: 24

## 2019-06-05 PROCEDURE — 73564 X-RAY EXAM KNEE 4 OR MORE: CPT | Mod: 26,LT

## 2019-06-05 PROCEDURE — 73610 X-RAY EXAM OF ANKLE: CPT | Mod: 26,50

## 2019-06-05 PROCEDURE — 73630 X-RAY EXAM OF FOOT: CPT

## 2019-06-06 ENCOUNTER — APPOINTMENT (OUTPATIENT)
Dept: OTOLARYNGOLOGY | Facility: CLINIC | Age: 74
End: 2019-06-06
Payer: MEDICARE

## 2019-06-06 VITALS
BODY MASS INDEX: 22.02 KG/M2 | TEMPERATURE: 98.7 F | HEIGHT: 64 IN | SYSTOLIC BLOOD PRESSURE: 125 MMHG | WEIGHT: 129 LBS | OXYGEN SATURATION: 99 % | DIASTOLIC BLOOD PRESSURE: 69 MMHG | HEART RATE: 82 BPM

## 2019-06-06 DIAGNOSIS — G47.8 OTHER SLEEP DISORDERS: ICD-10-CM

## 2019-06-06 DIAGNOSIS — K21.9 GASTRO-ESOPHAGEAL REFLUX DISEASE W/OUT ESOPHAGITIS: ICD-10-CM

## 2019-06-06 DIAGNOSIS — J30.9 ALLERGIC RHINITIS, UNSPECIFIED: ICD-10-CM

## 2019-06-06 DIAGNOSIS — H61.23 IMPACTED CERUMEN, BILATERAL: ICD-10-CM

## 2019-06-06 PROCEDURE — 99214 OFFICE O/P EST MOD 30 MIN: CPT

## 2019-06-06 NOTE — HISTORY OF PRESENT ILLNESS
[de-identified] : Told by her audiologist that she has cerumen impactions. Denies qtips. \par Longstanding hearing loss thought to be somewhat hereditary & uses bilateral BTE aids. Last audio years ago. Denies tinnitus, pain, or drainage. Stable. \par Seasonal allergies that she doesn't treat. Prior allergy testing diffusely positive, but she declined AIT. Still not using the flonase daily and has some PND that's been bothering her. \par Has LPR for which she follows some reflux precautions; took a month of the ranitidine but didn't realize that she had refills. Her clearing has improved and globus has resolved. 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 No changes.

## 2019-06-06 NOTE — ASSESSMENT
[FreeTextEntry1] : Reinforced reflux precautions and ranitidine use; improved on the current regimen. Discussed proper flonase use.

## 2019-06-25 ENCOUNTER — FORM ENCOUNTER (OUTPATIENT)
Age: 74
End: 2019-06-25

## 2019-06-26 ENCOUNTER — APPOINTMENT (OUTPATIENT)
Dept: RADIOLOGY | Facility: CLINIC | Age: 74
End: 2019-06-26
Payer: MEDICARE

## 2019-06-26 ENCOUNTER — APPOINTMENT (OUTPATIENT)
Dept: ORTHOPEDIC SURGERY | Facility: CLINIC | Age: 74
End: 2019-06-26
Payer: MEDICARE

## 2019-06-26 ENCOUNTER — OUTPATIENT (OUTPATIENT)
Dept: OUTPATIENT SERVICES | Facility: HOSPITAL | Age: 74
LOS: 1 days | End: 2019-06-26

## 2019-06-26 DIAGNOSIS — M25.572 PAIN IN LEFT ANKLE AND JOINTS OF LEFT FOOT: ICD-10-CM

## 2019-06-26 DIAGNOSIS — Z90.49 ACQUIRED ABSENCE OF OTHER SPECIFIED PARTS OF DIGESTIVE TRACT: Chronic | ICD-10-CM

## 2019-06-26 DIAGNOSIS — Z98.890 OTHER SPECIFIED POSTPROCEDURAL STATES: Chronic | ICD-10-CM

## 2019-06-26 DIAGNOSIS — M23.8X2 OTHER INTERNAL DERANGEMENTS OF LEFT KNEE: ICD-10-CM

## 2019-06-26 PROCEDURE — 99024 POSTOP FOLLOW-UP VISIT: CPT

## 2019-06-26 PROCEDURE — 73630 X-RAY EXAM OF FOOT: CPT | Mod: 26,RT

## 2019-07-09 ENCOUNTER — APPOINTMENT (OUTPATIENT)
Dept: ENDOCRINOLOGY | Facility: CLINIC | Age: 74
End: 2019-07-09
Payer: MEDICARE

## 2019-07-09 VITALS
SYSTOLIC BLOOD PRESSURE: 125 MMHG | HEART RATE: 84 BPM | WEIGHT: 132 LBS | BODY MASS INDEX: 22.66 KG/M2 | DIASTOLIC BLOOD PRESSURE: 69 MMHG

## 2019-07-09 DIAGNOSIS — M81.0 AGE-RELATED OSTEOPOROSIS W/OUT CURRENT PATHOLOGICAL FRACTURE: ICD-10-CM

## 2019-07-09 DIAGNOSIS — Z82.62 FAMILY HISTORY OF OSTEOPOROSIS: ICD-10-CM

## 2019-07-09 PROCEDURE — 99205 OFFICE O/P NEW HI 60 MIN: CPT

## 2019-07-09 RX ORDER — RANITIDINE 150 MG/1
150 TABLET ORAL
Qty: 60 | Refills: 5 | Status: DISCONTINUED | COMMUNITY
Start: 2019-05-07 | End: 2019-07-09

## 2019-07-09 RX ORDER — ESTRADIOL 0.1 MG/G
0.1 CREAM VAGINAL
Refills: 0 | Status: ACTIVE | COMMUNITY

## 2019-07-09 RX ORDER — FLUTICASONE PROPIONATE 50 UG/1
50 SPRAY, METERED NASAL
Qty: 1 | Refills: 5 | Status: DISCONTINUED | COMMUNITY
Start: 2019-05-07 | End: 2019-07-09

## 2019-07-09 RX ORDER — PNV NO.95/FERROUS FUM/FOLIC AC 28MG-0.8MG
TABLET ORAL
Refills: 0 | Status: ACTIVE | COMMUNITY

## 2019-07-09 RX ORDER — METHENAMINE HIPPURATE 1 G/1
TABLET ORAL
Refills: 0 | Status: ACTIVE | COMMUNITY

## 2019-07-09 RX ORDER — CETIRIZINE HYDROCHLORIDE 10 MG/1
10 TABLET, COATED ORAL
Qty: 30 | Refills: 5 | Status: DISCONTINUED | COMMUNITY
Start: 2019-06-06 | End: 2019-07-09

## 2019-07-15 LAB
25(OH)D3 SERPL-MCNC: 58.1 NG/ML
ALBUMIN MFR SERPL ELPH: 56.5 %
ALBUMIN SERPL ELPH-MCNC: 4.2 G/DL
ALBUMIN SERPL-MCNC: 3.9 G/DL
ALBUMIN/GLOB SERPL: 1.3 RATIO
ALBUPE: 16.7 %
ALP BLD-CCNC: 85 U/L
ALP BONE SERPL-MCNC: 18 MCG/L
ALPHA1 GLOB MFR SERPL ELPH: 3.4 %
ALPHA1 GLOB SERPL ELPH-MCNC: 0.2 G/DL
ALPHA1UPE: 36 %
ALPHA2 GLOB MFR SERPL ELPH: 9.2 %
ALPHA2 GLOB SERPL ELPH-MCNC: 0.6 G/DL
ALPHA2UPE: 19.9 %
ALT SERPL-CCNC: 11 U/L
ANION GAP SERPL CALC-SCNC: 14 MMOL/L
AST SERPL-CCNC: 22 U/L
B-GLOBULIN MFR SERPL ELPH: 11.1 %
B-GLOBULIN SERPL ELPH-MCNC: 0.8 G/DL
BASOPHILS # BLD AUTO: 0.05 K/UL
BASOPHILS NFR BLD AUTO: 0.7 %
BETAUPE: 16.3 %
BILIRUB SERPL-MCNC: 0.3 MG/DL
BUN SERPL-MCNC: 14 MG/DL
CALCIUM SERPL-MCNC: 9.5 MG/DL
CALCIUM SERPL-MCNC: 9.5 MG/DL
CHLORIDE SERPL-SCNC: 102 MMOL/L
CO2 SERPL-SCNC: 22 MMOL/L
CREAT SERPL-MCNC: 0.91 MG/DL
EOSINOPHIL # BLD AUTO: 0.12 K/UL
EOSINOPHIL NFR BLD AUTO: 1.8 %
GAMMA GLOB FLD ELPH-MCNC: 1.4 G/DL
GAMMA GLOB MFR SERPL ELPH: 19.8 %
GAMMAUPE: 11.1 %
GLUCOSE SERPL-MCNC: 102 MG/DL
HCT VFR BLD CALC: 45 %
HGB BLD-MCNC: 14 G/DL
IGA 24H UR QL IFE: NORMAL
IMM GRANULOCYTES NFR BLD AUTO: 0.3 %
INTERPRETATION SERPL IEP-IMP: NORMAL
KAPPA LC 24H UR QL: NORMAL
LYMPHOCYTES # BLD AUTO: 1.2 K/UL
LYMPHOCYTES NFR BLD AUTO: 17.8 %
MAGNESIUM SERPL-MCNC: 2.1 MG/DL
MAN DIFF?: NORMAL
MCHC RBC-ENTMCNC: 29.2 PG
MCHC RBC-ENTMCNC: 31.1 GM/DL
MCV RBC AUTO: 93.8 FL
MONOCYTES # BLD AUTO: 0.56 K/UL
MONOCYTES NFR BLD AUTO: 8.3 %
NEUTROPHILS # BLD AUTO: 4.81 K/UL
NEUTROPHILS NFR BLD AUTO: 71.1 %
PARATHYROID HORMONE INTACT: 20 PG/ML
PHOSPHATE SERPL-MCNC: 3.9 MG/DL
PLATELET # BLD AUTO: 200 K/UL
POTASSIUM SERPL-SCNC: 4.4 MMOL/L
PROT PATTERN 24H UR ELPH-IMP: NORMAL
PROT SERPL-MCNC: 6.9 G/DL
PROT SERPL-MCNC: 6.9 G/DL
PROT SERPL-MCNC: 7.1 G/DL
PROT UR-MCNC: 19 MG/DL
PROT UR-MCNC: 19 MG/DL
RBC # BLD: 4.8 M/UL
RBC # FLD: 14.3 %
SODIUM SERPL-SCNC: 138 MMOL/L
TSH SERPL-ACNC: 1.16 UIU/ML
TTG IGA SER IA-ACNC: <1.2 U/ML
TTG IGA SER-ACNC: NEGATIVE
TTG IGG SER IA-ACNC: <1.2 U/ML
TTG IGG SER IA-ACNC: NEGATIVE
WBC # FLD AUTO: 6.76 K/UL

## 2019-07-15 NOTE — PHYSICAL EXAM
[Alert] : alert [No Acute Distress] : no acute distress [Healthy Appearance] : healthy appearance [Normal Sclera/Conjunctiva] : normal sclera/conjunctiva [Normal Oropharynx] : the oropharynx was normal [No Neck Mass] : no neck mass was observed [Supple] : the neck was supple [No LAD] : no lymphadenopathy [No Thyroid Nodules] : there were no palpable thyroid nodules [Thyroid Not Enlarged] : the thyroid was not enlarged [Clear to Auscultation] : lungs were clear to auscultation bilaterally [Normal Rate] : heart rate was normal  [Normal Rate and Effort] : normal respiratory rhythm and effort [Regular Rhythm] : with a regular rhythm [Normal S1, S2] : normal S1 and S2 [No Spinal Tenderness] : no spinal tenderness [Normal Gait] : normal gait [No Stigmata of Cushings Syndrome] : no stigmata of cushings syndrome [Normal Insight/Judgement] : insight and judgment were intact [Kyphosis] : no kyphosis present [Scoliosis] : scoliosis not present [Acanthosis Nigricans] : no acanthosis nigricans [de-identified] : no moon facies, no supraclavicular fat pads

## 2019-07-15 NOTE — HISTORY OF PRESENT ILLNESS
[FreeTextEntry1] : Ms. Guerrero is a 73 year-old woman with a history of hypothyroidism, osteoarthritis presenting for evaluation of osteoporosis.\par \par Bone History\par Menopause: Around age 48\par Osteoporosis diagnosed in April 2019 on routine bone density significant for T-scores of -1.2 at the lumbar spine, -1.9 at the femoral neck, -1.8 at the total hip, -2.7 at the 1/3 radius\par Fracture history: L1 compression fracture in 2016 in a fall after missing a step; fifth metatarsal fracture in 2019 in a fall after missing a step\par Family history: Mother with history of osteoporosis and kyphosis\par Treatment: None\par \par Falls: No\par Height loss: No\par Kidney stones: No\par Dental health: Regular appointments, no upcoming procedures planned\par Exercise: Limited recently due to metatarsal fracture; was doing Pilates and eugenia chi twice a week\par Dairy intake: 0-1 serving daily (milk in coffee, cheese a few times per week)\par Calcium supplements: Does not take regularly\par Multivitamin: Does not remember brand/dose\par Vitamin D supplements: 50,000 intl units weekly x 4 weeks\par \par Osteoporosis risk factors include: Postmenopausal status,  race, prior fracture, falls, height loss, small thin bones, tobacco use, excessive alcohol, anorexia, family history, vitamin D deficiency, corticosteroid use, seizure medications, malabsorption, hyperparathyroidism, hyperthyroidism.\par NEGATIVE EXCEPT: Postmenopausal status,  race, prior fracture, falls, family history

## 2019-07-15 NOTE — ASSESSMENT
[FreeTextEntry1] : Osteoporosis. She has a history of relatively low trauma fractures. Her lowest site is the distal radius. She has no history of prior osteoporosis therapy. We discussed completion of a metabolic evaluation for secondary causes of bone loss. We discussed the potential benefits and risks of the osteoanabolic and antiresorptive classes of pharmacologic osteoporosis therapy, with a focus on the antiresorptive class per her preference. We discussed the potential benefits and risks of antiresorptive osteoporosis therapy at length, including but not limited to osteonecrosis of the jaw and atypical femoral fracture. She is amenable to therapy with zoledronic acid or denosumab pending further evaluation. We discussed that antiresorptive therapy does not impair fracture healing.\par Metabolic evaluation for secondary causes of osteoporosis\par Calcium 1200 mg daily from diet and supplements (to be taken in divided doses as no more than 500-600 mg can be absorbed at one time); increase dietary and/or supplemental calcium\par Vitamin D at least 800 intl units daily or more pending level\par Diet, exercise and fall prevention discussed\par \par I reviewed the DXA performed on April 5, 2019 with the patient today.\par I counseled the patient regarding calcium and vitamin D intake today.\par I discussed the following osteoporosis therapies: Alendronate, risedronate, ibandronate, zoledronic acid, denosumab, teriparatide, abaloparatide\par \par CC:\par Dr. Sera Carter, Fax [phone]

## 2019-07-15 NOTE — ADDENDUM
[FreeTextEntry1] : Recent test results as below, overall negative for a secondary cause of bone loss; discussed with MsItalo Cesar. She was advised to discuss the borderline elevated urine protein with her primary care provider. She wants to further consider her options for osteoporosis therapy. She will call me with her decision. 7/15/19

## 2019-07-15 NOTE — RESULTS/DATA
[L1 - L4] : L1 - L4 [Hologic] : hologic [BMD ___ g/cm2] : BMD: [unfilled] g/cm2 [T-Score ___] : T-score: [unfilled] [FreeTextEntry2] : April 5, 2019

## 2019-07-30 ENCOUNTER — APPOINTMENT (OUTPATIENT)
Dept: PHARMACY | Facility: CLINIC | Age: 74
End: 2019-07-30
Payer: MEDICARE

## 2019-07-30 ENCOUNTER — APPOINTMENT (OUTPATIENT)
Dept: OTOLARYNGOLOGY | Facility: CLINIC | Age: 74
End: 2019-07-30

## 2019-07-30 ENCOUNTER — FORM ENCOUNTER (OUTPATIENT)
Age: 74
End: 2019-07-30

## 2019-07-30 PROCEDURE — V5299A: CUSTOM | Mod: NC

## 2019-07-31 ENCOUNTER — APPOINTMENT (OUTPATIENT)
Dept: RADIOLOGY | Facility: CLINIC | Age: 74
End: 2019-07-31

## 2019-07-31 ENCOUNTER — OUTPATIENT (OUTPATIENT)
Dept: OUTPATIENT SERVICES | Facility: HOSPITAL | Age: 74
LOS: 1 days | End: 2019-07-31
Payer: MEDICARE

## 2019-07-31 ENCOUNTER — APPOINTMENT (OUTPATIENT)
Dept: ORTHOPEDIC SURGERY | Facility: CLINIC | Age: 74
End: 2019-07-31
Payer: MEDICARE

## 2019-07-31 VITALS — HEIGHT: 64 IN | RESPIRATION RATE: 16 BRPM | BODY MASS INDEX: 22.53 KG/M2 | WEIGHT: 132 LBS

## 2019-07-31 DIAGNOSIS — M62.452 CONTRACTURE OF MUSCLE, LEFT THIGH: ICD-10-CM

## 2019-07-31 DIAGNOSIS — M25.362 OTHER INSTABILITY, LEFT KNEE: ICD-10-CM

## 2019-07-31 DIAGNOSIS — S93.491A SPRAIN OF OTHER LIGAMENT OF RIGHT ANKLE, INITIAL ENCOUNTER: ICD-10-CM

## 2019-07-31 DIAGNOSIS — Z90.49 ACQUIRED ABSENCE OF OTHER SPECIFIED PARTS OF DIGESTIVE TRACT: Chronic | ICD-10-CM

## 2019-07-31 DIAGNOSIS — Z98.890 OTHER SPECIFIED POSTPROCEDURAL STATES: Chronic | ICD-10-CM

## 2019-07-31 PROCEDURE — 99024 POSTOP FOLLOW-UP VISIT: CPT

## 2019-07-31 PROCEDURE — 73630 X-RAY EXAM OF FOOT: CPT | Mod: 26,RT

## 2019-07-31 PROCEDURE — 73630 X-RAY EXAM OF FOOT: CPT

## 2019-09-24 ENCOUNTER — APPOINTMENT (OUTPATIENT)
Dept: PHARMACY | Facility: CLINIC | Age: 74
End: 2019-09-24
Payer: MEDICARE

## 2019-09-24 PROCEDURE — 92571 FILTERED SPEECH TEST: CPT

## 2019-09-26 ENCOUNTER — FORM ENCOUNTER (OUTPATIENT)
Age: 74
End: 2019-09-26

## 2019-09-27 ENCOUNTER — OUTPATIENT (OUTPATIENT)
Dept: OUTPATIENT SERVICES | Facility: HOSPITAL | Age: 74
LOS: 1 days | End: 2019-09-27
Payer: MEDICARE

## 2019-09-27 ENCOUNTER — APPOINTMENT (OUTPATIENT)
Dept: ORTHOPEDIC SURGERY | Facility: CLINIC | Age: 74
End: 2019-09-27
Payer: MEDICARE

## 2019-09-27 ENCOUNTER — APPOINTMENT (OUTPATIENT)
Dept: RADIOLOGY | Facility: CLINIC | Age: 74
End: 2019-09-27

## 2019-09-27 VITALS — BODY MASS INDEX: 22.53 KG/M2 | HEIGHT: 64 IN | WEIGHT: 132 LBS

## 2019-09-27 DIAGNOSIS — M17.12 UNILATERAL PRIMARY OSTEOARTHRITIS, LEFT KNEE: ICD-10-CM

## 2019-09-27 DIAGNOSIS — M76.32 ILIOTIBIAL BAND SYNDROME, LEFT LEG: ICD-10-CM

## 2019-09-27 DIAGNOSIS — Z98.890 OTHER SPECIFIED POSTPROCEDURAL STATES: Chronic | ICD-10-CM

## 2019-09-27 DIAGNOSIS — Z90.49 ACQUIRED ABSENCE OF OTHER SPECIFIED PARTS OF DIGESTIVE TRACT: Chronic | ICD-10-CM

## 2019-09-27 PROCEDURE — 73564 X-RAY EXAM KNEE 4 OR MORE: CPT | Mod: 26,LT

## 2019-09-27 PROCEDURE — 99214 OFFICE O/P EST MOD 30 MIN: CPT

## 2019-09-27 PROCEDURE — 73564 X-RAY EXAM KNEE 4 OR MORE: CPT

## 2019-10-01 ENCOUNTER — LABORATORY RESULT (OUTPATIENT)
Age: 74
End: 2019-10-01

## 2019-10-01 ENCOUNTER — APPOINTMENT (OUTPATIENT)
Dept: RHEUMATOLOGY | Facility: CLINIC | Age: 74
End: 2019-10-01
Payer: MEDICARE

## 2019-10-01 VITALS
WEIGHT: 132 LBS | HEART RATE: 68 BPM | OXYGEN SATURATION: 98 % | BODY MASS INDEX: 22.53 KG/M2 | TEMPERATURE: 98.2 F | SYSTOLIC BLOOD PRESSURE: 146 MMHG | DIASTOLIC BLOOD PRESSURE: 83 MMHG | HEIGHT: 64 IN

## 2019-10-01 DIAGNOSIS — R23.8 OTHER SKIN CHANGES: ICD-10-CM

## 2019-10-01 DIAGNOSIS — L50.9 URTICARIA, UNSPECIFIED: ICD-10-CM

## 2019-10-01 DIAGNOSIS — R53.83 OTHER FATIGUE: ICD-10-CM

## 2019-10-01 DIAGNOSIS — R21 RASH AND OTHER NONSPECIFIC SKIN ERUPTION: ICD-10-CM

## 2019-10-01 PROCEDURE — 36415 COLL VENOUS BLD VENIPUNCTURE: CPT

## 2019-10-01 PROCEDURE — 99205 OFFICE O/P NEW HI 60 MIN: CPT | Mod: 25

## 2019-10-01 RX ORDER — LISDEXAMFETAMINE DIMESYLATE 10 MG/1
10 CAPSULE ORAL
Refills: 0 | Status: DISCONTINUED | COMMUNITY
End: 2019-10-01

## 2019-10-01 NOTE — PHYSICAL EXAM
[General Appearance - In No Acute Distress] : in no acute distress [General Appearance - Alert] : alert [General Appearance - Well Nourished] : well nourished [Sclera] : the sclera and conjunctiva were normal [General Appearance - Well Developed] : well developed [PERRL With Normal Accommodation] : pupils were equal in size, round, and reactive to light [Oropharynx] : the oropharynx was normal [Examination Of The Oral Cavity] : the lips and gums were normal [Neck Appearance] : the appearance of the neck was normal [Neck Cervical Mass (___cm)] : no neck mass was observed [Jugular Venous Distention Increased] : there was no jugular-venous distention [Respiration, Rhythm And Depth] : normal respiratory rhythm and effort [Auscultation Breath Sounds / Voice Sounds] : lungs were clear to auscultation bilaterally [Heart Sounds] : normal S1 and S2 [Heart Rate And Rhythm] : heart rate was normal and rhythm regular [Murmurs] : no murmurs [Heart Sounds Gallop] : no gallops [Heart Sounds Pericardial Friction Rub] : no pericardial rub [Arterial Pulses Carotid] : carotid pulses were normal with no bruits [Veins - Varicosity Changes] : there were no varicosital changes [Edema] : there was no peripheral edema [Abdomen Soft] : soft [Bowel Sounds] : normal bowel sounds [Abdomen Tenderness] : non-tender [] : no hepato-splenomegaly [Cervical Lymph Nodes Enlarged Anterior Bilaterally] : anterior cervical [Cervical Lymph Nodes Enlarged Posterior Bilaterally] : posterior cervical [No CVA Tenderness] : no ~M costovertebral angle tenderness [Supraclavicular Lymph Nodes Enlarged Bilaterally] : supraclavicular [No Spinal Tenderness] : no spinal tenderness [Nail Clubbing] : no clubbing  or cyanosis of the fingernails [Motor Tone] : muscle strength and tone were normal [Sensation] : the sensory exam was normal to light touch and pinprick [Motor Exam] : the motor exam was normal [Affect] : the affect was normal [Mood] : the mood was normal [FreeTextEntry1] : R forearm 3-4 cm hyperpigmented round, raised, blanchable non tender but pruritic lesion, LE with hyperpigmented areas on previously affected lesion.

## 2019-10-01 NOTE — ASSESSMENT
[FreeTextEntry1] : 72 y/o F with above PMH presents with 3 month hx of recurrent, hyperemic, raised and pruritic skin rash, that blanches  with residual hyperpigmented lesions, saw 2 different Dermatologist with no specific diagnoses, treated with abx for possible cellulitis and lyme reported negative. \par Topical steroid helped for short period of time. \par Only new medication pt was taking Provigil for sleep which was stopped about 10 days ago, however she developed new lesion yesterday( picture taken and saved)\par ROS: negative constitutional symptoms, except mild fatigue. \par Ddx of her presentation is most likely urticaria, drug reaction, will rule out other underlying autoimmune disease related Urticaria. \par \par 1. Check Rheum labs \par 2. Infectious work up; hep panel, TB, Lyme, RMSF\par 3. Suggested pt to see Dr. Harrison and given no impovement she most likely need a skin biopsy to rule out urticarial vasculitis \par 4. Pt has hx of seasonal allergies and her allergy testing was diffusely positive, which supports allergy/hypersensitivity component of her presentation. \par \par \par f/u after derm appt.

## 2019-10-01 NOTE — HISTORY OF PRESENT ILLNESS
[Skin Lesions] : skin lesions [Skin Nodules] : skin nodules [Arthralgias] : arthralgias [Morning Stiffness] : morning stiffness [Difficulty Standing] : difficulty standing [Difficulty Walking] : difficulty walking [Dry Eyes] : dry eyes [FreeTextEntry1] : Referred by Dr. Allen for Rheumatology consultation\par \par 74 y/o pleasant female with medical problems of JOANNE switched from CPAP to MRD , long standing hearing loss hypothyroidism,  seasonal allergies, prior allergy testing diffusely positive, osteoporosis with fracture, currently not on any treatment and plan is to start prolia.  \par Since early summer she developed rash, when she spent some time in [place] , first lesion around her ankle, describes as swollen, raised  pruritic that last for about 1 week, saw MD soon after rash and  lyme tested negative, followed with R ankle swelling, resolved on it's own. \par She was treated with Antibiotics for possible cellulitis. \par Rash happened in her legs, arms, chest and  upper back, no facial or abdominal \par Only new medication she thinks could attribute her new rash is Provigil for sleep, stopped it 10 days ago as recommended  but she is experiencing new lesions, tried Benadryl but without significant help. \par Saw 2 different  dermatologist, topical steroid cream helped somewhat, other derm suspected new medication exposure or bedbug bite. \par \par Few minutes AM stiffness in her feet. Denies current joint swelling, except traumatic knee pain and evaluated by Ortho Dr. Allen. \par Takes amoxicillin for gum infection \par primary responder of WTC, gets annual checks with periodic CXR. \par Reports treated few times for lyme disease. \par \par No h/o  memory loss, patchy hair loss, sicca symptoms, photosensitivity, HA,  Raynaud's, oral ulcers, nasal ulcers. Denies recurrent sinusitis or hx of serositis. \par Personal or family hx of autoimmune disease, no hx of psoriasis\par \par  [Anorexia] : no anorexia [Weight Loss] : no weight loss [Malaise] : no malaise [Chills] : no chills [Fever] : no fever [Fatigue] : no fatigue [Oral Ulcers] : no oral ulcers [Cough] : no cough [Dysphonia] : no dysphonia [Dry Mouth] : no dry mouth [Dysphagia] : no dysphagia [Chest Pain] : no chest pain [Shortness of Breath] : no shortness of breath [Joint Swelling] : no joint swelling [Joint Warmth] : no joint warmth [Joint Deformity] : no joint deformity [Dyspnea] : no dyspnea [Decreased ROM] : no decreased range of motion [Myalgias] : no myalgias [Muscle Spasms] : no muscle spasms [Muscle Weakness] : no muscle weakness [Muscle Cramping] : no muscle cramping [Visual Changes] : no visual changes [Eye Pain] : no eye pain [Eye Redness] : no eye redness

## 2019-10-01 NOTE — REVIEW OF SYSTEMS
[Eye Pain] : eye pain [Dry Eyes] : dryness of the eyes [Skin Lesions] : skin lesion [Itching] : itching [Easy Bruising] : a tendency for easy bruising [Fever] : no fever [Feeling Poorly] : not feeling poorly [Chills] : no chills [Recent Weight Gain (___ Lbs)] : no recent weight gain [Feeling Tired] : not feeling tired [Recent Weight Loss (___ Lbs)] : no recent weight loss [Eyesight Problems] : no eyesight problems [Red Eyes] : eyes not red [Discharge From Eyes] : no purulent discharge from the eyes [Eyes Itch] : no itching of the eyes [Earache] : no earache [Loss Of Hearing] : no hearing loss [Nasal Discharge] : no nasal discharge [Nosebleeds] : no nosebleeds [Hoarseness] : no hoarseness [Sore Throat] : no sore throat [Heart Rate Is Slow] : the heart rate was not slow [Heart Rate Is Fast] : the heart rate was not fast [Chest Pain] : no chest pain [Palpitations] : no palpitations [Leg Claudication] : no intermittent leg claudication [Lower Ext Edema] : no lower extremity edema [Shortness Of Breath] : no shortness of breath [Wheezing] : no wheezing [Cough] : no cough [SOB on Exertion] : no shortness of breath during exertion [Abdominal Pain] : no abdominal pain [Vomiting] : no vomiting [Constipation] : no constipation [Diarrhea] : no diarrhea [Heartburn] : no heartburn [Melena] : no melena [Skin Wound] : no skin wound [Confused] : no confusion [Change In A Mole] : no change in a mole [Dizziness] : no dizziness [Easy Bleeding] : no tendency for easy bleeding [Convulsions] : no convulsions [Swollen Glands] : no swollen glands [Swollen Glands In The Neck] : no swollen glands in the neck

## 2019-10-02 ENCOUNTER — APPOINTMENT (OUTPATIENT)
Dept: DERMATOLOGY | Facility: CLINIC | Age: 74
End: 2019-10-02
Payer: MEDICARE

## 2019-10-02 ENCOUNTER — LABORATORY RESULT (OUTPATIENT)
Age: 74
End: 2019-10-02

## 2019-10-02 VITALS — DIASTOLIC BLOOD PRESSURE: 75 MMHG | SYSTOLIC BLOOD PRESSURE: 114 MMHG

## 2019-10-02 DIAGNOSIS — Z80.8 FAMILY HISTORY OF MALIGNANT NEOPLASM OF OTHER ORGANS OR SYSTEMS: ICD-10-CM

## 2019-10-02 DIAGNOSIS — L30.9 DERMATITIS, UNSPECIFIED: ICD-10-CM

## 2019-10-02 LAB
ALBUMIN SERPL ELPH-MCNC: 4.6 G/DL
ALP BLD-CCNC: 91 U/L
ALT SERPL-CCNC: 14 U/L
ANION GAP SERPL CALC-SCNC: 16 MMOL/L
AST SERPL-CCNC: 18 U/L
B BURGDOR AB SER-IMP: NEGATIVE
B BURGDOR IGG+IGM SER QL IB: NORMAL
B BURGDOR IGG+IGM SER QL: 0.76 INDEX
BASOPHILS # BLD AUTO: 0.04 K/UL
BASOPHILS NFR BLD AUTO: 0.7 %
BILIRUB SERPL-MCNC: 0.4 MG/DL
BUN SERPL-MCNC: 14 MG/DL
CALCIUM SERPL-MCNC: 9.3 MG/DL
CHLORIDE SERPL-SCNC: 100 MMOL/L
CK SERPL-CCNC: 50 U/L
CO2 SERPL-SCNC: 22 MMOL/L
CONFIRM: 28.7 SEC
CREAT SERPL-MCNC: 0.84 MG/DL
CRP SERPL-MCNC: 0.17 MG/DL
DRVVT IMM 1:2 NP PPP: NORMAL
DRVVT SCREEN TO CONFIRM RATIO: 0.99 RATIO
EOSINOPHIL # BLD AUTO: 0.13 K/UL
EOSINOPHIL NFR BLD AUTO: 2.3 %
ERYTHROCYTE [SEDIMENTATION RATE] IN BLOOD BY WESTERGREN METHOD: 14 MM/HR
GLUCOSE SERPL-MCNC: 91 MG/DL
HBV CORE IGG+IGM SER QL: NONREACTIVE
HBV CORE IGM SER QL: NONREACTIVE
HBV SURFACE AB SER QL: NONREACTIVE
HBV SURFACE AG SER QL: NONREACTIVE
HCT VFR BLD CALC: 46.7 %
HCV AB SER QL: NONREACTIVE
HCV S/CO RATIO: 0.26 S/CO
HGB BLD-MCNC: 14.5 G/DL
HIV1+2 AB SPEC QL IA.RAPID: NONREACTIVE
IMM GRANULOCYTES NFR BLD AUTO: 0.2 %
INR PPP: 0.97 RATIO
LYMPHOCYTES # BLD AUTO: 1.44 K/UL
LYMPHOCYTES NFR BLD AUTO: 25.2 %
MAN DIFF?: NORMAL
MCHC RBC-ENTMCNC: 28.7 PG
MCHC RBC-ENTMCNC: 31 GM/DL
MCV RBC AUTO: 92.5 FL
MONOCYTES # BLD AUTO: 0.52 K/UL
MONOCYTES NFR BLD AUTO: 9.1 %
MPO AB + PR3 PNL SER: NORMAL
NEUTROPHILS # BLD AUTO: 3.58 K/UL
NEUTROPHILS NFR BLD AUTO: 62.5 %
PLATELET # BLD AUTO: 195 K/UL
POTASSIUM SERPL-SCNC: 4.6 MMOL/L
PROT SERPL-MCNC: 7.5 G/DL
PT BLD: 11.2 SEC
RBC # BLD: 5.05 M/UL
RBC # FLD: 14.1 %
RHEUMATOID FACT SER QL: <10 IU/ML
SCREEN DRVVT: 34.1 SEC
SILICA CLOTTING TIME INTERPRETATION: NORMAL
SILICA CLOTTING TIME S/C: 0.86 RATIO
SODIUM SERPL-SCNC: 138 MMOL/L
WBC # FLD AUTO: 5.72 K/UL

## 2019-10-02 PROCEDURE — 99203 OFFICE O/P NEW LOW 30 MIN: CPT | Mod: 25

## 2019-10-02 PROCEDURE — 11104 PUNCH BX SKIN SINGLE LESION: CPT

## 2019-10-02 RX ORDER — BETAMETHASONE DIPROPIONATE 0.5 MG/G
0.05 CREAM TOPICAL
Qty: 1 | Refills: 0 | Status: ACTIVE | COMMUNITY
Start: 2019-10-02 | End: 1900-01-01

## 2019-10-03 LAB
ALBUMIN MFR SERPL ELPH: 56.3 %
ALBUMIN SERPL-MCNC: 4.2 G/DL
ALBUMIN/GLOB SERPL: 1.3 RATIO
ALBUPE: 12.8 %
ALPHA1 GLOB MFR SERPL ELPH: 3.9 %
ALPHA1 GLOB SERPL ELPH-MCNC: 0.3 G/DL
ALPHA1UPE: 38.6 %
ALPHA2 GLOB MFR SERPL ELPH: 8.9 %
ALPHA2 GLOB SERPL ELPH-MCNC: 0.7 G/DL
ALPHA2UPE: 22.3 %
ANCA AB SER-IMP: NEGATIVE
B-GLOBULIN MFR SERPL ELPH: 11.5 %
B-GLOBULIN SERPL ELPH-MCNC: 0.9 G/DL
B2 GLYCOPROT1 IGA SERPL IA-ACNC: <5 SAU
B2 GLYCOPROT1 IGG SER-ACNC: <5 SGU
B2 GLYCOPROT1 IGM SER-ACNC: <5 SMU
BETAUPE: 17 %
C-ANCA SER-ACNC: NEGATIVE
C3 SERPL-MCNC: 150 MG/DL
C4 SERPL-MCNC: 30 MG/DL
CARDIOLIPIN IGM SER-MCNC: 7.8 GPL
CARDIOLIPIN IGM SER-MCNC: <5 MPL
CCP AB SER IA-ACNC: <8 UNITS
DEPRECATED CARDIOLIPIN IGA SER: <5 APL
DEPRECATED KAPPA LC FREE/LAMBDA SER: 1.16 RATIO
DSDNA AB SER-ACNC: <12 IU/ML
GAMMA GLOB FLD ELPH-MCNC: 1.5 G/DL
GAMMA GLOB MFR SERPL ELPH: 19.4 %
GAMMAUPE: 9.3 %
IGA 24H UR QL IFE: NORMAL
IGA SER QL IEP: 256 MG/DL
IGG SER QL IEP: 1636 MG/DL
IGM SER QL IEP: 68 MG/DL
INTERPRETATION SERPL IEP-IMP: NORMAL
KAPPA LC 24H UR QL: NORMAL
KAPPA LC CSF-MCNC: 1.47 MG/DL
KAPPA LC SERPL-MCNC: 1.7 MG/DL
M PROTEIN SPEC IFE-MCNC: NORMAL
M TB IFN-G BLD-IMP: NEGATIVE
P-ANCA TITR SER IF: NEGATIVE
PROT PATTERN 24H UR ELPH-IMP: NORMAL
PROT SERPL-MCNC: 7.5 G/DL
PROT SERPL-MCNC: 7.5 G/DL
PROT UR-MCNC: 8 MG/DL
PROT UR-MCNC: 8 MG/DL
QUANTIFERON TB PLUS MITOGEN MINUS NIL: >10 IU/ML
QUANTIFERON TB PLUS NIL: 0.06 IU/ML
QUANTIFERON TB PLUS TB1 MINUS NIL: -0.02 IU/ML
QUANTIFERON TB PLUS TB2 MINUS NIL: -0.02 IU/ML
RF+CCP IGG SER-IMP: NEGATIVE

## 2019-10-04 LAB
ANA SER IF-ACNC: NEGATIVE
ENA JO1 AB SER IA-ACNC: <0.2 AL
ENA RNP AB SER IA-ACNC: <0.2 AL
ENA SM AB SER IA-ACNC: <0.2 AL

## 2019-10-05 LAB — ACE BLD-CCNC: 44 U/L

## 2019-10-07 ENCOUNTER — APPOINTMENT (OUTPATIENT)
Dept: PHARMACY | Facility: CLINIC | Age: 74
End: 2019-10-07
Payer: MEDICARE

## 2019-10-07 LAB — CRYOGLOB SERPL-MCNC: NEGATIVE

## 2019-10-07 PROCEDURE — V5299A: CUSTOM | Mod: NC

## 2019-10-09 LAB
R RICKETTSI IGG CSF-ACNC: NEGATIVE
R RICKETTSI IGM CSF-ACNC: 0.36 INDEX

## 2019-10-15 ENCOUNTER — APPOINTMENT (OUTPATIENT)
Dept: DERMATOLOGY | Facility: CLINIC | Age: 74
End: 2019-10-15
Payer: MEDICARE

## 2019-10-15 DIAGNOSIS — L29.9 PRURITUS, UNSPECIFIED: ICD-10-CM
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
## 2019-10-15 PROCEDURE — 99214 OFFICE O/P EST MOD 30 MIN: CPT

## 2019-10-17 ENCOUNTER — APPOINTMENT (OUTPATIENT)
Dept: PHARMACY | Facility: CLINIC | Age: 74
End: 2019-10-17
Payer: MEDICARE

## 2019-10-17 PROCEDURE — V5299A: CUSTOM | Mod: NC

## 2019-10-28 ENCOUNTER — EMERGENCY (EMERGENCY)
Facility: HOSPITAL | Age: 74
LOS: 1 days | Discharge: HOME | End: 2019-10-28
Admitting: EMERGENCY MEDICINE
Payer: MEDICARE

## 2019-10-28 VITALS
WEIGHT: 132.06 LBS | TEMPERATURE: 98 F | HEART RATE: 82 BPM | DIASTOLIC BLOOD PRESSURE: 67 MMHG | OXYGEN SATURATION: 99 % | RESPIRATION RATE: 18 BRPM | SYSTOLIC BLOOD PRESSURE: 123 MMHG

## 2019-10-28 DIAGNOSIS — Z98.890 OTHER SPECIFIED POSTPROCEDURAL STATES: Chronic | ICD-10-CM

## 2019-10-28 DIAGNOSIS — Z90.49 ACQUIRED ABSENCE OF OTHER SPECIFIED PARTS OF DIGESTIVE TRACT: Chronic | ICD-10-CM

## 2019-10-28 LAB
APPEARANCE UR: CLEAR — SIGNIFICANT CHANGE UP
BILIRUB UR-MCNC: NEGATIVE — SIGNIFICANT CHANGE UP
COLOR SPEC: ABNORMAL
DIFF PNL FLD: ABNORMAL
GLUCOSE UR QL: 100
KETONES UR-MCNC: NEGATIVE — SIGNIFICANT CHANGE UP
LEUKOCYTE ESTERASE UR-ACNC: ABNORMAL
NITRITE UR-MCNC: POSITIVE
PH UR: 5.5 — SIGNIFICANT CHANGE UP (ref 5–8)
PROT UR-MCNC: ABNORMAL MG/DL
SP GR SPEC: 1.01 — SIGNIFICANT CHANGE UP (ref 1–1.03)
UROBILINOGEN FLD QL: 2 E.U./DL

## 2019-10-28 PROCEDURE — 99283 EMERGENCY DEPT VISIT LOW MDM: CPT

## 2019-10-28 RX ORDER — CEFUROXIME AXETIL 250 MG
250 TABLET ORAL ONCE
Refills: 0 | Status: DISCONTINUED | OUTPATIENT
Start: 2019-10-28 | End: 2019-10-28

## 2019-10-28 RX ORDER — CEFUROXIME AXETIL 250 MG
1 TABLET ORAL
Qty: 14 | Refills: 0
Start: 2019-10-28 | End: 2019-11-03

## 2019-10-28 RX ORDER — IBUPROFEN 200 MG
1 TABLET ORAL
Qty: 30 | Refills: 0
Start: 2019-10-28

## 2019-10-28 NOTE — ED PROVIDER NOTE - OBJECTIVE STATEMENT
72 y/o F with PMH of thyroid disease, arthritis, sleep apnea presents to ED c/o L knee pain s/p mechanical slip and fall 3 weeks ago.  She was evaluated s/p fall and had a normal xray but continues to have pain and weakness to knee.  She localizes pain to L lateral knee.      She also c/o dysuria since last night.  She reports history of recurrent UTIs.  She takes methenamine to prevent UTIs.  Pt denies fevers/chills, abd pain, weakness, numbness, back pain.

## 2019-10-28 NOTE — ED PROVIDER NOTE - NSFOLLOWUPINSTRUCTIONS_ED_ALL_ED_FT
Take NSAID as prescribed until complete.  Follow up with primary care provider and orthopedics.    Take antibiotic as prescribed.    Return for fever, vomiting, back pain, abdominal pain or persistent symptoms.

## 2019-10-28 NOTE — ED PROVIDER NOTE - MUSCULOSKELETAL MINIMAL EXAM
L knee:  FROM, no midline TTP.  TTP along lateral knee and Lateral upper leg.  No instability, + 2 popliteal pulse.

## 2019-10-28 NOTE — ED ADULT TRIAGE NOTE - CHIEF COMPLAINT QUOTE
Pt c/o left knee pain. Fell on it 3 weeks ago. Able to bear weight. Also c/o dysuria and frequency. Hx of utis.

## 2019-10-28 NOTE — ED PROVIDER NOTE - CLINICAL SUMMARY MEDICAL DECISION MAKING FREE TEXT BOX
72 y/o F presents to ED c/o L knee pain and dysuria.  Pt well appearing, VSS. NAD.  x-ray reviewed from prior visit shows osteoarthritis.  Pts pain may be secondary to OA vs IT band syndrome given lateral upper leg TTP.  Pt advised NSAID and ortho f/u    UA 72 y/o F presents to ED c/o L knee pain and dysuria.  Pt well appearing, VSS. NAD.  x-ray reviewed from prior visit shows osteoarthritis.  Pts pain may be secondary to OA vs IT band syndrome given lateral upper leg TTP.  Pt advised NSAID and ortho f/u.     UA positive for infection.  Urine culture sent.  Pt is traveling to [place] today and can be reached via email if needed.

## 2019-10-28 NOTE — ED PROVIDER NOTE - PATIENT PORTAL LINK FT
You can access the FollowMyHealth Patient Portal offered by [facility] by registering at the following website: [email or web address]. By joining [facility]’s FollowMyHealth portal, you will also be able to view your health information using other applications (apps) compatible with our system.

## 2019-11-04 DIAGNOSIS — Z79.899 OTHER LONG TERM (CURRENT) DRUG THERAPY: ICD-10-CM
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
## 2019-11-04 DIAGNOSIS — Z88.8 ALLERGY STATUS TO OTHER DRUGS, MEDICAMENTS AND BIOLOGICAL SUBSTANCES STATUS: ICD-10-CM

## 2019-11-04 DIAGNOSIS — M25.562 PAIN IN LEFT KNEE: ICD-10-CM

## 2019-11-04 DIAGNOSIS — N30.00 ACUTE CYSTITIS WITHOUT HEMATURIA: ICD-10-CM
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
## 2019-11-04 DIAGNOSIS — M79.605 PAIN IN LEFT LEG: ICD-10-CM
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
## 2019-11-29 NOTE — ED PROVIDER NOTE - OBJECTIVE STATEMENT
Assessment/Plan:     Diagnoses and all orders for this visit:    Diabetes mellitus type 2 in obese ([facility] )    Type 1 diabetes mellitus without complication ([facility] )  -     Hemoglobin A1C; Future  -     Microalbumin / creatinine urine ratio    Hypothyroidism, unspecified type  -     TSH, 3rd generation; Future    PSA elevation    HTN (hypertension), benign  -     Comprehensive metabolic panel; Future  -     CBC and differential; Future  -     UA w Reflex to Microscopic w Reflex to Culture          Subjective:      Patient ID: Eladia Sifuentes is a 57 y o  male      HPI    Jean-Pierre Blackburn is here today for a visit and diabetic wellness check generally feels well he is under the care of Dr Ayaan Berry  his optometrist and  Dr Kuldeep Driver his podiatrist he is on an insulin pump he checks his sugars meticulously and otherwise feels well he had a colonoscopy a few months ago and some material was removed and biopsied which turned out to be lymphoid tissue and there was no malignancy seen he otherwise feels well seems to be active in enjoying life we did review his recent lab studies which were satisfactory    The following portions of the patient's history were reviewed and updated as appropriate: allergies, current medications, past family history, past medical history, past social history, past surgical history and problem list     Review of Systems  noncontributory except for some mild back pain and stiffness    Objective:      /82 (BP Location: Right arm, Patient Position: Sitting, Cuff Size: Large)   Pulse 75   Temp 98 °F (36 7 °C) (Tympanic)   Wt 122 kg (270 lb)   SpO2 98%   BMI 32 87 kg/m²    BP Readings from Last 3 Encounters:   11/29/19 128/82   11/12/19 118/68   08/15/19 118/70      Wt Readings from Last 3 Encounters:   11/29/19 122 kg (270 lb)   11/12/19 123 kg (272 lb)   08/15/19 123 kg (272 lb 3 2 oz)      BMI: Estimated body mass index is 32 87 kg/m² as calculated from the following:    Height as of 11/12/19: 6' 4" (1 93 m)  Weight as of this encounter: 122 kg (270 lb)  BSA: Estimated body surface area is 2 51 meters squared as calculated from the following:    Height as of 11/12/19: 6' 4" (1 93 m)  Weight as of this encounter: 122 kg (270 lb)  Lab Results   Component Value Date    HGBA1C 6 2 10/21/2019    HGBA1C 6 2 05/15/2019    HGBA1C 6 2 02/11/2019    HGBA1C 5 9 03/01/2017    HGBA1C 5 9 03/01/2017       PRIMARY CARE PHYSICIAN: Jorge Virk MD  Most recent appointment with PCP: 11/29/2019  Next appointment with PCP: 02/24/2020         Physical Exam  Denise Fuentes looks well as blood pressure is 128/82 taken by me with a large cuff in the right arm cardiac examination is normal the lungs are clear there is no edema I went over all of his lab studies carefully will order CBC TSH CMP and A1c and a urinalysis for is next visit no changes are made in his medications we did notice that he is a type 1 diabetic and has been so for at least 40 or 50 years his renal function is normal he has no urine microalbumin his last A1c was 6 2 he should be congratulated for his excellent diabetic management      Current Outpatient Medications:     ascorbic acid (VITAMIN C) 250 mg tablet, Take 250 mg by mouth daily, Disp: , Rfl:     Cholecalciferol 66291 units TABS, Take 2 daily, Disp: 60 tablet, Rfl: 3    glucagon (GLUCAGON EMERGENCY) 1 MG injection, USE AS DIRECTED, Disp: 3 kit, Rfl: 8    glucose blood (ONE TOUCH ULTRA TEST) test strip, Use as instructed, Disp: 400 each, Rfl: 6    HUMALOG 100 UNIT/ML injection, Use as directed  Patient to add Humalog to diabetic pump as needed  , Disp: 200 mL, Rfl: 3    hydrochlorothiazide (HYDRODIURIL) 25 mg tablet, Take 1 tablet (25 mg total) by mouth daily Alternate 25mg  And 50mg every other day , Disp: 90 tablet, Rfl: 3    insulin lispro (HumaLOG) 100 units/mL injection, Take as necessary via pump, Disp: 160 mL, Rfl: 4    Insulin Pump Accessories (SNAP INSULIN PUMP CONTROLLER) MISC, , Disp: , Rfl:     Insulin Syringe-Needle U-100 (B-D INS SYRINGE 0 5CC/30GX1/2") 30G X 1/2" 0 5 ML MISC, Inject into the skin daily Use as directed, Disp: 100 each, Rfl: 5    levothyroxine 100 mcg tablet, Take 1 5 tablets (150 mcg total) by mouth daily, Disp: 135 tablet, Rfl: 3    lisinopril (ZESTRIL) 10 mg tablet, Take 3 tablets (30 mg total) by mouth daily, Disp: 270 tablet, Rfl: 3    Na Sulfate-K Sulfate-Mg Sulf 17 5-3 13-1 6 GM/177ML SOLN, Take 1 applicator by mouth once for 1 dose, Disp: 1 Bottle, Rfl: 0    ONE TOUCH ULTRA TEST test strip, TEST UP TO 14 TIMES A DAY, Disp: 400 each, Rfl: 6    SUMAtriptan (IMITREX) 25 mg tablet, Take 1 tablet (25 mg total) by mouth once as needed for migraine for up to 1 dose, Disp: 18 tablet, Rfl: 3    traMADol (ULTRAM) 50 mg tablet, Take 1 tablet (50 mg total) by mouth every 6 (six) hours, Disp: 360 tablet, Rfl: 3    vitamin E, tocopherol, 200 units capsule, Take 200 Units by mouth daily, Disp: , Rfl:     This note was completed in part utilizing M-Modal Fluency Direct Software  Grammatical errors, random word insertions, spelling mistakes, and incomplete sentences can be an occasional consequence of this system secondary to software limitations, ambient noise, and hardware issues  If you have any question or concerns about the content, text, or information contained within the body of this dictation, please contact the provider for clarification  72 y/o Female presents to the ED c/o right foot pain. Pt sprained her foot and sustained a fracture to the 5th metatarsal 10 days ago. 1 week ago she saw Dr. Bobby where repeat images were done and was told take Tylenol Extra strength, and elevate foot. She comes in today for progressively worsening pain when bearing weight and is concerned because she initially was able to ambulate with very minimal pain a few days ago. Denies numbness and tingling to toes and foot.

## 2020-01-31 ENCOUNTER — APPOINTMENT (OUTPATIENT)
Dept: ORTHOPEDIC SURGERY | Facility: CLINIC | Age: 75
End: 2020-01-31

## 2020-02-18 ENCOUNTER — FORM ENCOUNTER (OUTPATIENT)
Age: 75
End: 2020-02-18

## 2020-02-19 ENCOUNTER — APPOINTMENT (OUTPATIENT)
Dept: ORTHOPEDIC SURGERY | Facility: CLINIC | Age: 75
End: 2020-02-19
Payer: MEDICARE

## 2020-02-19 ENCOUNTER — OUTPATIENT (OUTPATIENT)
Dept: OUTPATIENT SERVICES | Facility: HOSPITAL | Age: 75
LOS: 1 days | End: 2020-02-19
Payer: MEDICARE

## 2020-02-19 ENCOUNTER — APPOINTMENT (OUTPATIENT)
Dept: RADIOLOGY | Facility: CLINIC | Age: 75
End: 2020-02-19

## 2020-02-19 VITALS — WEIGHT: 135 LBS | BODY MASS INDEX: 23.05 KG/M2 | RESPIRATION RATE: 16 BRPM | HEIGHT: 64 IN

## 2020-02-19 DIAGNOSIS — Z90.49 ACQUIRED ABSENCE OF OTHER SPECIFIED PARTS OF DIGESTIVE TRACT: Chronic | ICD-10-CM

## 2020-02-19 DIAGNOSIS — M79.671 PAIN IN RIGHT FOOT: ICD-10-CM

## 2020-02-19 DIAGNOSIS — Z98.890 OTHER SPECIFIED POSTPROCEDURAL STATES: Chronic | ICD-10-CM

## 2020-02-19 DIAGNOSIS — S92.354A NONDISPLACED FRACTURE OF FIFTH METATARSAL BONE, RIGHT FOOT, INITIAL ENCOUNTER FOR CLOSED FRACTURE: ICD-10-CM

## 2020-02-19 PROCEDURE — 73630 X-RAY EXAM OF FOOT: CPT | Mod: 26,RT

## 2020-02-19 PROCEDURE — 99214 OFFICE O/P EST MOD 30 MIN: CPT

## 2020-02-21 PROBLEM — M79.671 RIGHT FOOT PAIN: Status: ACTIVE | Noted: 2019-05-13

## 2020-05-08 ENCOUNTER — TRANSCRIPTION ENCOUNTER (OUTPATIENT)
Age: 75
End: 2020-05-08

## 2021-02-23 NOTE — ED PROVIDER NOTE - FAMILY HISTORY
Interventional Radiology Note    Patient called today because she had new serous drainage from her port over the past 24 hours.  She previously came to clinic due to persistent scab within the incision which was incompletely healing after 1 month.    Today there is new redness overlying the port.  This does demonstrates some hyperemia upon touch.  It does not feel hot.  There is a small degree of surrounding bruising.  When compared to a picture the patient took 10 days ago, this redness was not seen.  I could not express any fluid from the port, but the patient says only a little bit drains when it gets bumped.  Patient denies fever/chills.      Given new erythema and reported drainage, I think the port should be removed, cultured and later replaced.    Patient will set up appointment within the next 1-3 days.  Patient warned to call sooner with worsening symptoms.  Patient will likely need a short line holiday.  Port could be replaced next week.      Feb 23, 2021 (taken in clinic)       Feb 12, 2020 (personally taken by patient on cell phone)       Chinmay Hogan MD  Vascular and Interventional Radiology  [id number]      
No pertinent family history in first degree relatives

## 2021-10-05 ENCOUNTER — EMERGENCY (EMERGENCY)
Facility: HOSPITAL | Age: 76
LOS: 1 days | Discharge: HOME | End: 2021-10-05
Admitting: EMERGENCY MEDICINE
Payer: MEDICARE

## 2021-10-05 VITALS
RESPIRATION RATE: 18 BRPM | HEART RATE: 84 BPM | TEMPERATURE: 98 F | WEIGHT: 149.91 LBS | OXYGEN SATURATION: 98 % | DIASTOLIC BLOOD PRESSURE: 68 MMHG | SYSTOLIC BLOOD PRESSURE: 103 MMHG

## 2021-10-05 VITALS
DIASTOLIC BLOOD PRESSURE: 69 MMHG | SYSTOLIC BLOOD PRESSURE: 111 MMHG | HEART RATE: 82 BPM | TEMPERATURE: 98 F | OXYGEN SATURATION: 97 % | RESPIRATION RATE: 16 BRPM

## 2021-10-05 DIAGNOSIS — R10.84 GENERALIZED ABDOMINAL PAIN: ICD-10-CM

## 2021-10-05 DIAGNOSIS — G47.00 INSOMNIA, UNSPECIFIED: ICD-10-CM

## 2021-10-05 DIAGNOSIS — R19.7 DIARRHEA, UNSPECIFIED: ICD-10-CM

## 2021-10-05 DIAGNOSIS — Z87.442 PERSONAL HISTORY OF URINARY CALCULI: ICD-10-CM

## 2021-10-05 DIAGNOSIS — Z98.890 OTHER SPECIFIED POSTPROCEDURAL STATES: Chronic | ICD-10-CM

## 2021-10-05 DIAGNOSIS — Z90.49 ACQUIRED ABSENCE OF OTHER SPECIFIED PARTS OF DIGESTIVE TRACT: Chronic | ICD-10-CM

## 2021-10-05 DIAGNOSIS — Z98.890 OTHER SPECIFIED POSTPROCEDURAL STATES: ICD-10-CM

## 2021-10-05 DIAGNOSIS — R11.2 NAUSEA WITH VOMITING, UNSPECIFIED: ICD-10-CM

## 2021-10-05 DIAGNOSIS — Z88.5 ALLERGY STATUS TO NARCOTIC AGENT: ICD-10-CM

## 2021-10-05 DIAGNOSIS — Z87.19 PERSONAL HISTORY OF OTHER DISEASES OF THE DIGESTIVE SYSTEM: ICD-10-CM

## 2021-10-05 DIAGNOSIS — Z88.2 ALLERGY STATUS TO SULFONAMIDES: ICD-10-CM

## 2021-10-05 DIAGNOSIS — R50.9 FEVER, UNSPECIFIED: ICD-10-CM

## 2021-10-05 DIAGNOSIS — Z90.49 ACQUIRED ABSENCE OF OTHER SPECIFIED PARTS OF DIGESTIVE TRACT: ICD-10-CM

## 2021-10-05 DIAGNOSIS — F90.9 ATTENTION-DEFICIT HYPERACTIVITY DISORDER, UNSPECIFIED TYPE: ICD-10-CM

## 2021-10-05 LAB
ALBUMIN SERPL ELPH-MCNC: 3.3 G/DL — LOW (ref 3.4–5)
ALP SERPL-CCNC: 78 U/L — SIGNIFICANT CHANGE UP (ref 40–120)
ALT FLD-CCNC: 16 U/L — SIGNIFICANT CHANGE UP (ref 12–42)
ANION GAP SERPL CALC-SCNC: 9 MMOL/L — SIGNIFICANT CHANGE UP (ref 9–16)
AST SERPL-CCNC: 19 U/L — SIGNIFICANT CHANGE UP (ref 15–37)
BASOPHILS # BLD AUTO: 0.02 K/UL — SIGNIFICANT CHANGE UP (ref 0–0.2)
BASOPHILS NFR BLD AUTO: 0.3 % — SIGNIFICANT CHANGE UP (ref 0–2)
BILIRUB SERPL-MCNC: 0.6 MG/DL — SIGNIFICANT CHANGE UP (ref 0.2–1.2)
BUN SERPL-MCNC: 13 MG/DL — SIGNIFICANT CHANGE UP (ref 7–23)
CALCIUM SERPL-MCNC: 8.4 MG/DL — LOW (ref 8.5–10.5)
CHLORIDE SERPL-SCNC: 105 MMOL/L — SIGNIFICANT CHANGE UP (ref 96–108)
CO2 SERPL-SCNC: 27 MMOL/L — SIGNIFICANT CHANGE UP (ref 22–31)
CREAT SERPL-MCNC: 1.1 MG/DL — SIGNIFICANT CHANGE UP (ref 0.5–1.3)
EOSINOPHIL # BLD AUTO: 0.03 K/UL — SIGNIFICANT CHANGE UP (ref 0–0.5)
EOSINOPHIL NFR BLD AUTO: 0.5 % — SIGNIFICANT CHANGE UP (ref 0–6)
GLUCOSE SERPL-MCNC: 123 MG/DL — HIGH (ref 70–99)
HCT VFR BLD CALC: 44 % — SIGNIFICANT CHANGE UP (ref 34.5–45)
HGB BLD-MCNC: 14.3 G/DL — SIGNIFICANT CHANGE UP (ref 11.5–15.5)
IMM GRANULOCYTES NFR BLD AUTO: 0.3 % — SIGNIFICANT CHANGE UP (ref 0–1.5)
LACTATE SERPL-SCNC: 1 MMOL/L — SIGNIFICANT CHANGE UP (ref 0.4–2)
LIDOCAIN IGE QN: 125 U/L — SIGNIFICANT CHANGE UP (ref 73–393)
LYMPHOCYTES # BLD AUTO: 0.57 K/UL — LOW (ref 1–3.3)
LYMPHOCYTES # BLD AUTO: 10 % — LOW (ref 13–44)
MCHC RBC-ENTMCNC: 29.2 PG — SIGNIFICANT CHANGE UP (ref 27–34)
MCHC RBC-ENTMCNC: 32.5 GM/DL — SIGNIFICANT CHANGE UP (ref 32–36)
MCV RBC AUTO: 90 FL — SIGNIFICANT CHANGE UP (ref 80–100)
MONOCYTES # BLD AUTO: 0.34 K/UL — SIGNIFICANT CHANGE UP (ref 0–0.9)
MONOCYTES NFR BLD AUTO: 5.9 % — SIGNIFICANT CHANGE UP (ref 2–14)
NEUTROPHILS # BLD AUTO: 4.74 K/UL — SIGNIFICANT CHANGE UP (ref 1.8–7.4)
NEUTROPHILS NFR BLD AUTO: 83 % — HIGH (ref 43–77)
NRBC # BLD: 0 /100 WBCS — SIGNIFICANT CHANGE UP (ref 0–0)
PLATELET # BLD AUTO: 191 K/UL — SIGNIFICANT CHANGE UP (ref 150–400)
POTASSIUM SERPL-MCNC: 3.5 MMOL/L — SIGNIFICANT CHANGE UP (ref 3.5–5.3)
POTASSIUM SERPL-SCNC: 3.5 MMOL/L — SIGNIFICANT CHANGE UP (ref 3.5–5.3)
PROT SERPL-MCNC: 7.3 G/DL — SIGNIFICANT CHANGE UP (ref 6.4–8.2)
RBC # BLD: 4.89 M/UL — SIGNIFICANT CHANGE UP (ref 3.8–5.2)
RBC # FLD: 13.6 % — SIGNIFICANT CHANGE UP (ref 10.3–14.5)
SODIUM SERPL-SCNC: 141 MMOL/L — SIGNIFICANT CHANGE UP (ref 132–145)
WBC # BLD: 5.72 K/UL — SIGNIFICANT CHANGE UP (ref 3.8–10.5)
WBC # FLD AUTO: 5.72 K/UL — SIGNIFICANT CHANGE UP (ref 3.8–10.5)

## 2021-10-05 PROCEDURE — 93010 ELECTROCARDIOGRAM REPORT: CPT

## 2021-10-05 PROCEDURE — 99284 EMERGENCY DEPT VISIT MOD MDM: CPT | Mod: 25

## 2021-10-05 RX ORDER — SODIUM CHLORIDE 9 MG/ML
1000 INJECTION INTRAMUSCULAR; INTRAVENOUS; SUBCUTANEOUS ONCE
Refills: 0 | Status: COMPLETED | OUTPATIENT
Start: 2021-10-05 | End: 2021-10-05

## 2021-10-05 RX ORDER — ONDANSETRON 8 MG/1
1 TABLET, FILM COATED ORAL
Qty: 15 | Refills: 0
Start: 2021-10-05

## 2021-10-05 RX ORDER — ONDANSETRON 8 MG/1
4 TABLET, FILM COATED ORAL ONCE
Refills: 0 | Status: COMPLETED | OUTPATIENT
Start: 2021-10-05 | End: 2021-10-05

## 2021-10-05 RX ORDER — FAMOTIDINE 10 MG/ML
20 INJECTION INTRAVENOUS ONCE
Refills: 0 | Status: COMPLETED | OUTPATIENT
Start: 2021-10-05 | End: 2021-10-05

## 2021-10-05 RX ADMIN — ONDANSETRON 4 MILLIGRAM(S): 8 TABLET, FILM COATED ORAL at 11:07

## 2021-10-05 RX ADMIN — SODIUM CHLORIDE 1000 MILLILITER(S): 9 INJECTION INTRAMUSCULAR; INTRAVENOUS; SUBCUTANEOUS at 11:08

## 2021-10-05 RX ADMIN — SODIUM CHLORIDE 1000 MILLILITER(S): 9 INJECTION INTRAMUSCULAR; INTRAVENOUS; SUBCUTANEOUS at 13:08

## 2021-10-05 RX ADMIN — FAMOTIDINE 20 MILLIGRAM(S): 10 INJECTION INTRAVENOUS at 11:08

## 2021-10-05 NOTE — ED PROVIDER NOTE - PATIENT PORTAL LINK FT
You can access the FollowMyHealth Patient Portal offered by [facility] by registering at the following website: [email or web address]. By joining [facility]’s FollowMyHealth portal, you will also be able to view your health information using other applications (apps) compatible with our system.

## 2021-10-05 NOTE — ED PROVIDER NOTE - NSFOLLOWUPINSTRUCTIONS_ED_ALL_ED_FT
Follow up with your doctor  Drink plenty of fluids.   Zofran as needed for nausea.    Diarrhea is frequent loose or watery bowel movements that has many causes. Diarrhea can make you feel weak and cause you to become dehydrated. Diarrhea typically lasts 2–3 days, but can last longer if it is a sign of something more serious. Drink clear fluids to prevent dehydration. Eat bland, easy-to-digest foods as tolerated.     SEEK IMMEDIATE MEDICAL CARE IF YOU HAVE ANY OF THE FOLLOWING SYMPTOMS: fever, lightheadedness/dizziness, chest pain, black or bloody stools, shortness of breath, severe abdominal or back pain, or any signs of dehydration.

## 2021-10-05 NOTE — ED PROVIDER NOTE - PROGRESS NOTE DETAILS
patient feeling better. tolerating po. abdomen soft nontender nondistended. labs reviewed unremarkable. patient states she is feeling "so much better". urinated twice in ED with clear urine. patient drank water, crackers in ED, tolerated well. she would like to be discharged home. strict return precautions discussed. f/u pmd. patient agrees with plan, will dc.

## 2021-10-05 NOTE — ED PROVIDER NOTE - OBJECTIVE STATEMENT
76 y/o F with PMHx of ADD [on Wellbutrin] presents to the ED for N/V/D since yesterday. Pt reports she was recently in contact with her grandchildren who were exhibiting GI symptoms. Pt began having diarrhea yesterday [3-4 episodes] as well as vomiting [4-5 episodes]. Pt states her symptoms are similar to what her grandchildren were exhibiting. She states she is currently unable to keep any thing down. Pt tried eating saltines and drinking ginger ale but she was unable to tolerate it. She also endorses mild generalized Abd pain. Pt measured a temperature of 99F. Her symptoms did not improve today and she decided to come to the ED for evaluation. Pt denies blood in her stool or urinary symptoms. Of note, Pt is vaccinated for COVID-19. 76 y/o F with PMHx of ADD presents to the ED for N/V/D since yesterday. Pt reports she was recently in contact with her grandchildren who had the "stomach bug". Pt began having diarrhea yesterday [3-4 episodes watery nonbloody] as well as vomiting [4-5 episodes]. Pt states her symptoms are similar to what her grandchildren were exhibiting. She states she is currently unable to keep any thing down. Pt tried eating saltines and drinking ginger ale but she was unable to tolerate it. assoc mild  generalized abdominal cramping. Pt states she measured a temperature of 99F, denies fever. Her symptoms did not improve today and she decided to come to the ED for evaluation. Pt denies blood in her stool or urinary symptoms. Of note, Pt is vaccinated for COVID-19. no chest pain. no dyspnea. no syncope.

## 2021-10-05 NOTE — ED PROVIDER NOTE - PHYSICAL EXAMINATION
VITAL SIGNS: I have reviewed nursing notes and confirm.  CONSTITUTIONAL: Well-developed; well-nourished; in no acute distress.  SKIN: Skin is warm and dry, no acute rash.  HEAD: Normocephalic; atraumatic.  EYES: PERRL, EOM intact; conjunctiva and sclera clear.  ENT: No nasal discharge; airway clear.  NECK: Supple; non tender.  CARD: S1, S2 normal; no murmurs, gallops, or rubs. Regular rate and rhythm.  RESP: No wheezes, rales or rhonchi.  ABD: Normal bowel sounds; soft; non-distended; non-tender.  MSK: Normal ROM. No clubbing, cyanosis or edema.  NEURO: Alert, oriented. Grossly unremarkable.  PSYCH: Cooperative, appropriate. VITAL SIGNS: I have reviewed nursing notes and confirm.  CONSTITUTIONAL: Well-developed; well-nourished; in no acute distress.  SKIN: Skin is warm and dry, no acute rash.  HEAD: Normocephalic; atraumatic.  EYES: PERRL, EOM intact; conjunctiva and sclera clear.  ENT: No nasal discharge; airway clear. mmm  NECK: Supple; non tender.  CARD: S1, S2 normal; no murmurs, gallops, or rubs. Regular rate and rhythm.  RESP: No wheezes, rales or rhonchi.  ABD: soft; non-distended; non-tender.  MSK: Normal ROM x 4  NEURO: Alert, oriented. Grossly unremarkable.  PSYCH: Cooperative, appropriate.

## 2021-10-05 NOTE — ED PROVIDER NOTE - CLINICAL SUMMARY MEDICAL DECISION MAKING FREE TEXT BOX
well appearing, NAD with nausea, vomiting and diarrhea, benign abdominal exam. no clinical signs of dehydration, most likely viral gastroenteritis. pepcid, zofran, IVF, labs ordered. do not anticipate need for CT at this time. reassess.

## 2021-11-01 NOTE — ED PROVIDER NOTE - NS ED MD DISPO DISCHARGE CCDA
Writer met with Lillie Lorenz to explain  role and to discuss aftercare options.    Primary Care Provider[facility]    Outpatient Mental Health Medication Provider: Declined BOYD for now    Outpatient Therapist: Declined BOYD for now    : Signed BOYD for [facility]    Pt was going to renew for mental health services however now is getting services thru [facility] and she thinks they don't contract with [employer].    Pt is aware of aftercare options. Pt does not have virtual capabilities at home. Patient confirms address and telephone number listed on the facesheet.     CM to collaborate with treatment team and patient before setting up further aftercare, which will be complete by time of discharge.     Luli Rojas  11/1/2021     Patient/Caregiver provided printed discharge information.

## 2021-11-20 ENCOUNTER — TRANSCRIPTION ENCOUNTER (OUTPATIENT)
Age: 76
End: 2021-11-20

## 2021-11-24 ENCOUNTER — TRANSCRIPTION ENCOUNTER (OUTPATIENT)
Age: 76
End: 2021-11-24

## 2021-12-22 ENCOUNTER — EMERGENCY (EMERGENCY)
Facility: HOSPITAL | Age: 76
LOS: 1 days | Discharge: HOME | End: 2021-12-22
Attending: EMERGENCY MEDICINE | Admitting: EMERGENCY MEDICINE
Payer: MEDICARE

## 2021-12-22 VITALS
TEMPERATURE: 98 F | DIASTOLIC BLOOD PRESSURE: 85 MMHG | OXYGEN SATURATION: 98 % | RESPIRATION RATE: 16 BRPM | HEART RATE: 66 BPM | SYSTOLIC BLOOD PRESSURE: 134 MMHG

## 2021-12-22 VITALS
HEART RATE: 82 BPM | RESPIRATION RATE: 18 BRPM | SYSTOLIC BLOOD PRESSURE: 122 MMHG | WEIGHT: 110.01 LBS | TEMPERATURE: 98 F | OXYGEN SATURATION: 99 % | DIASTOLIC BLOOD PRESSURE: 71 MMHG | HEIGHT: 65 IN

## 2021-12-22 DIAGNOSIS — Z98.890 OTHER SPECIFIED POSTPROCEDURAL STATES: Chronic | ICD-10-CM

## 2021-12-22 DIAGNOSIS — R07.9 CHEST PAIN, UNSPECIFIED: ICD-10-CM

## 2021-12-22 DIAGNOSIS — Z90.49 ACQUIRED ABSENCE OF OTHER SPECIFIED PARTS OF DIGESTIVE TRACT: Chronic | ICD-10-CM

## 2021-12-22 DIAGNOSIS — U07.1 COVID-19: ICD-10-CM

## 2021-12-22 DIAGNOSIS — Z88.8 ALLERGY STATUS TO OTHER DRUGS, MEDICAMENTS AND BIOLOGICAL SUBSTANCES: ICD-10-CM

## 2021-12-22 PROCEDURE — 99284 EMERGENCY DEPT VISIT MOD MDM: CPT | Mod: CS,25

## 2021-12-22 PROCEDURE — 93010 ELECTROCARDIOGRAM REPORT: CPT

## 2021-12-22 NOTE — ED PROVIDER NOTE - PATIENT PORTAL LINK FT
You can access the FollowMyHealth Patient Portal offered by [facility] by registering at the following website: [email or web address]. By joining [facility]’s FollowMyHealth portal, you will also be able to view your health information using other applications (apps) compatible with our system.

## 2021-12-22 NOTE — ED PROVIDER NOTE - CLINICAL SUMMARY MEDICAL DECISION MAKING FREE TEXT BOX
offered supportive tx for covid. pt requesting info to be screened for covid mab. will dc w instructions

## 2021-12-22 NOTE — ED PROVIDER NOTE - OBJECTIVE STATEMENT
77 yo female pt, hx of hypothyroidism, sleep apnea. Presents after being dx w covid 2 days ago. requesting MAB infusion. pt experiencing headache, nasal congestion, dry cough, chest discomfort when coughing.

## 2021-12-22 NOTE — ED ADULT NURSE NOTE - CHIEF COMPLAINT QUOTE
reports dx with COVID-19 on 12/20, symptoms have progressed to chest, c/o sob and chest tightness. denies chest pain. +full vaccinated with booster. speaking in full sentences

## 2021-12-22 NOTE — ED ADULT TRIAGE NOTE - SOURCE OF INFORMATION
March 5, 2018      Faustina Lubin  [address] [place] [address]        Dear Ms.Tristian,    We are writing to inform you of your test results.      -Chlamydia and gonnohrea tests are normal.  Resulted Orders   Wet prep   Result Value Ref Range    Specimen Description Vagina     Wet Prep No clue cells seen     Wet Prep No Trichomonas seen     Wet Prep Yeast seen (A)    *UA reflex to Microscopic and Culture ([place] and [facility] (except [place] and [place])   Result Value Ref Range    Color Urine Yellow     Appearance Urine Clear     Glucose Urine Negative NEG^Negative mg/dL    Bilirubin Urine Negative NEG^Negative    Ketones Urine Negative NEG^Negative mg/dL    Specific Gravity Urine 1.020 1.003 - 1.035    Blood Urine Negative NEG^Negative    pH Urine 7.0 5.0 - 7.0 pH    Protein Albumin Urine Negative NEG^Negative mg/dL    Urobilinogen Urine 0.2 0.2 - 1.0 EU/dL    Nitrite Urine Negative NEG^Negative    Leukocyte Esterase Urine Negative NEG^Negative    Source Midstream Urine    Chlamydia trachomatis PCR   Result Value Ref Range    Specimen Description Urine     Chlamydia Trachomatis PCR Negative NEG^Negative      Comment:      Negative for C. trachomatis rRNA by transcription mediated amplification.  A negative result by transcription mediated amplification does not preclude   the presence of C. trachomatis infection because results are dependent on   proper and adequate collection, absence of inhibitors, and sufficient rRNA to   be detected.     Neisseria gonorrhoeae PCR   Result Value Ref Range    Specimen Descrip Urine     N Gonorrhea PCR Negative NEG^Negative      Comment:      Negative for N. gonorrhoeae rRNA by transcription mediated amplification.  A negative result by transcription mediated amplification does not preclude   the presence of N. gonorrhoeae infection because results are dependent on   proper and adequate collection, absence of inhibitors, and sufficient rRNA to   be  detected.         If you have any questions or concerns, please call the clinic at the number listed above.       Sincerely,        Isauro Hatfield PA-C                 Patient

## 2021-12-22 NOTE — ED ADULT TRIAGE NOTE - CHIEF COMPLAINT QUOTE
reports dx with COVID-19 on 12/20, symptoms have progressed to chest, c/o sob and chest tightness. denies chest pain. +full vaccinated with booster reports dx with COVID-19 on 12/20, symptoms have progressed to chest, c/o sob and chest tightness. denies chest pain. +full vaccinated with booster. speaking in full sentences

## 2022-02-08 ENCOUNTER — EMERGENCY (EMERGENCY)
Facility: HOSPITAL | Age: 77
LOS: 1 days | Discharge: HOME | End: 2022-02-08
Attending: EMERGENCY MEDICINE | Admitting: EMERGENCY MEDICINE
Payer: MEDICARE

## 2022-02-08 VITALS
SYSTOLIC BLOOD PRESSURE: 135 MMHG | WEIGHT: 121.92 LBS | TEMPERATURE: 98 F | HEIGHT: 65 IN | DIASTOLIC BLOOD PRESSURE: 72 MMHG | OXYGEN SATURATION: 98 % | RESPIRATION RATE: 18 BRPM | HEART RATE: 81 BPM

## 2022-02-08 DIAGNOSIS — Z23 ENCOUNTER FOR IMMUNIZATION: ICD-10-CM

## 2022-02-08 DIAGNOSIS — Z90.49 ACQUIRED ABSENCE OF OTHER SPECIFIED PARTS OF DIGESTIVE TRACT: Chronic | ICD-10-CM

## 2022-02-08 DIAGNOSIS — R51.9 HEADACHE, UNSPECIFIED: ICD-10-CM

## 2022-02-08 DIAGNOSIS — Z98.890 OTHER SPECIFIED POSTPROCEDURAL STATES: Chronic | ICD-10-CM
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
## 2022-02-08 DIAGNOSIS — M54.2 CERVICALGIA: ICD-10-CM
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
## 2022-02-08 DIAGNOSIS — S01.81XA LACERATION WITHOUT FOREIGN BODY OF OTHER PART OF HEAD, INITIAL ENCOUNTER: ICD-10-CM

## 2022-02-08 DIAGNOSIS — S09.90XA UNSPECIFIED INJURY OF HEAD, INITIAL ENCOUNTER: ICD-10-CM

## 2022-02-08 PROCEDURE — 73110 X-RAY EXAM OF WRIST: CPT | Mod: 26,LT

## 2022-02-08 PROCEDURE — 73130 X-RAY EXAM OF HAND: CPT | Mod: 26,LT,77

## 2022-02-08 PROCEDURE — 73130 X-RAY EXAM OF HAND: CPT | Mod: 26,LT

## 2022-02-08 PROCEDURE — 12051 INTMD RPR FACE/MM 2.5 CM/<: CPT

## 2022-02-08 PROCEDURE — 70486 CT MAXILLOFACIAL W/O DYE: CPT | Mod: 26,MH

## 2022-02-08 PROCEDURE — 70450 CT HEAD/BRAIN W/O DYE: CPT | Mod: 26,MH

## 2022-02-08 PROCEDURE — 99284 EMERGENCY DEPT VISIT MOD MDM: CPT | Mod: 25

## 2022-02-08 PROCEDURE — 72125 CT NECK SPINE W/O DYE: CPT | Mod: 26,MH

## 2022-02-08 RX ORDER — ACETAMINOPHEN 500 MG
975 TABLET ORAL ONCE
Refills: 0 | Status: COMPLETED | OUTPATIENT
Start: 2022-02-08 | End: 2022-02-08

## 2022-02-08 RX ORDER — TETANUS TOXOID, REDUCED DIPHTHERIA TOXOID AND ACELLULAR PERTUSSIS VACCINE, ADSORBED 5; 2.5; 8; 8; 2.5 [IU]/.5ML; [IU]/.5ML; UG/.5ML; UG/.5ML; UG/.5ML
0.5 SUSPENSION INTRAMUSCULAR ONCE
Refills: 0 | Status: COMPLETED | OUTPATIENT
Start: 2022-02-08 | End: 2022-02-08

## 2022-02-08 RX ADMIN — Medication 975 MILLIGRAM(S): at 21:13

## 2022-02-08 RX ADMIN — TETANUS TOXOID, REDUCED DIPHTHERIA TOXOID AND ACELLULAR PERTUSSIS VACCINE, ADSORBED 0.5 MILLILITER(S): 5; 2.5; 8; 8; 2.5 SUSPENSION INTRAMUSCULAR at 21:14
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
## 2022-02-08 NOTE — ED PROVIDER NOTE - PHYSICAL EXAMINATION
Physical Exam  GEN: Awake, alert, non-toxic appearing,  EYES: full EOMI, perrl  ENT: External inspection normal, normal voice,   HEAD: no hematoma  NECK: FROM neck, supple,   RESP: no tachypnea, no hypoxia, no resp distress,  MSK: base of L thumb tenderness/swelling, FROM of all extremities  SKIN: ~1.2cm laceration under chin, no fb noted  NEURO: ao x 3, speaking clearly and coherently, strength 5/5 bl, median/ulnar/radial nerves intact, steady gait, sensation equal bl,

## 2022-02-08 NOTE — ED ADULT TRIAGE NOTE - HEIGHT IN CM
FUTURE VISIT INFORMATION      FUTURE VISIT INFORMATION:    Date: 1/13/2021    Time: 2 PM    Location: [facility]  REFERRAL INFORMATION:    Referring provider:  Dr. Amarjit Fleming    Referring providers clinic:  [facility]    Reason for visit/diagnosis: Lesion or Mass of Paranasal Sinuses    RECORDS REQUESTED FROM:       Clinic name Comments Records Status Imaging Status   [facility] 11/19/20, 11/5/20, 10/15/20 - OV with Dr. Hartman Scanned In    [facility] - Imaging 10/22/20 - CT Head Brain  10/6/20 - CT Head Brain  10/6/20 - CT Cervical Spine Scanned In                         165.1

## 2022-02-08 NOTE — ED PROVIDER NOTE - PROGRESS NOTE DETAILS
xray reviewed w/ radiology, no acute fx xray reviewed w/ radiology, no acute fx    thumb spica splint applied seeking dc based on prelim result, understands that preliminary results may miss potentially life threatening conditions/diagnosis, ao x 4, has capacity to make decisions, understands return precautions

## 2022-02-08 NOTE — ED PROVIDER NOTE - OBJECTIVE STATEMENT
76 yof pw sp fall/trip.  no loc.  +headache/jaw/neck pain/left thumb pain.  denies sx prior to falling.  unknown tdap.

## 2022-02-08 NOTE — ED PROVIDER NOTE - SECONDARY DIAGNOSIS.
Diphtheria-pertussis-tetanus (DPT) vaccination administered at current visit Closed head injury Neck pain Laceration of chin

## 2022-02-08 NOTE — ED ADULT NURSE NOTE - BRAND OF FIRST COVID-19 BOOSTER
" [facility]    Facility:  [facility] [[id number]]    Code Status:  DNR/DNI    Chief Complaint   Patient presents with     H & P   :                    Patient Active Problem List   Diagnosis     Hypercalcemia     PEDRO (acute kidney injury) (H)     Constipation     unilateral rash     FTT (failure to thrive) in adult       History:  Abeba Martinez  is an 80 year old female with history of CKD 3, GERD, milk-alkali syndrome, lumbosacral spine disease, chronic opiate dependent pain seen for admission to TCU     Hospital Course: Patient was never hospitalized.  Rather she was seen in the emergency room on January 21 and admitted directly to this facility.    Patient presented with failure to thrive with generalized weakness.  There was concern for community-acquired pneumonia with possible left lower lobe infiltrate leading to community-acquired antibiotic treatment.    There is also a focus on her rash which has been present for months and is quite distinctive.    Provider and patient's family were concerned about her safety, and she reluctantly agreed to come to the facility here.    Subjective/ROS:    -augmented by discussion with facility staff involved in direct care      -Patient regrets her decision to come to the facility.  She does not like it here.  She wants to leave and go home as soon as possible.  -Patient's distinctive rash started probably in the late fall or early winter.  She thinks at least 2 months ago.  She described getting \"little bumps\" which were very itchy.  She scratched them and there was some weeping.  She says it never really hurt but was just very very itchy.  This is led to a lot of scratching and the rash is never gone away.  She says that now she has some discomfort with the rash but nothing severe or that she would seek attention for.  No known history of zoster vaccination.  -Hyponatremia noted and worsening.  She has been on tramadol but for " at least a year.  She generally takes 1 or 2 a day.  She knows she can have up to 3 a day.  -She says she takes the tramadol for low back pain.  She says she has disc disease L3-4-5.  She has some radiation into the upper part of her right leg when she is up and about.  She has had epidural injections in the past though quite a while ago.  She does have a history with [place] [facility] but she is not sure she is going to keep it if she is decided against surgery.  -Patient's weight has been stable over the last year or so.  Patient reports no headaches change in vision speaking swallowing hearing nausea vomiting diarrhea melena bright red blood per rectum dysuria fever sweats chills.  She did lose her strength in the bathroom here last night but did not fall to the floor.  She was able to lower herself onto the toilet.  She denies recent falls at home as well.  No dysuria.  She describes her mood is good.  Remainder negative    Past Medical History:   Diagnosis Date     Adult failure to thrive      PEDRO (acute kidney injury) (H)      Altered mental status      Chronic low back pain      CKD (chronic kidney disease)      Elevated serum creatinine      GERD (gastroesophageal reflux disease)      Lumbar canal stenosis      Milk alkali syndrome      Past Surgical History:   Procedure Laterality Date     APPENDECTOMY       CATARACT EXTRACTION, BILATERAL       VEIN LIGATION AND STRIPPING            Family History   Problem Relation Age of Onset     Alcohol abuse Father      Lung cancer Father      Diabetes Father      Depression Brother         suicide   :       Social History     Socioeconomic History     Marital status:      Spouse name: Not on file     Number of children: Not on file     Years of education: Not on file     Highest education level: Not on file   Occupational History     Not on file   Social Needs     Financial resource strain: Not on file     Food insecurity     Worry: Not on file     Inability: Not  on file     Transportation needs     Medical: Not on file     Non-medical: Not on file   Tobacco Use     Smoking status: Former Smoker     Packs/day: 0.50     Types: Cigarettes     Smokeless tobacco: Never Used   Substance and Sexual Activity     Alcohol use: Yes     Drug use: Not on file     Sexual activity: Not on file   Lifestyle     Physical activity     Days per week: Not on file     Minutes per session: Not on file     Stress: Not on file   Relationships     Social connections     Talks on phone: Not on file     Gets together: Not on file     Attends Congregation service: Not on file     Active member of club or organization: Not on file     Attends meetings of clubs or organizations: Not on file     Relationship status: Not on file     Intimate partner violence     Fear of current or ex partner: Not on file     Emotionally abused: Not on file     Physically abused: Not on file     Forced sexual activity: Not on file   Other Topics Concern     Not on file   Social History Narrative     Not on file   :    Patient lives in [facility].  She says she has a son that lives within a few blocks of her.  She has 3 other children.  She says that she no longer drives but she knows how to order her food which is delivered to her house.  She says she used to go out walking downtown but with the pandemic is now been close to a year since she has been able to do that.    Current Outpatient Medications on File Prior to Visit   Medication Sig Dispense Refill     famotidine (PEPCID) 40 MG tablet Take 40 mg by mouth daily.       lansoprazole (PREVACID) 30 MG capsule Take 30 mg by mouth 2 (two) times a day.       traMADoL (ULTRAM) 50 mg tablet Take 50 mg by mouth every 6 (six) hours as needed for pain.       traZODone (DESYREL) 150 MG tablet Take 150 mg by mouth at bedtime.        triamcinolone (KENALOG) 0.1 % cream Apply topically 2 (two) times a day.       No current facility-administered medications on file prior to visit.  "   :      ALLERGIES:  Patient has no known allergies.    Vitals:      Vital signs: Reviewed per facility EMR vitals including as follows:              125/54 respirations 16 temperature 98.8 pulse 95 O2 sats 97% weight is 102 pounds      Physical exam:    Patient is alert oriented x3 conversant with fluent speech.  She answers questions easily.  She follows commands easily.  Normocephalic/atraumatic sclera clear nonicteric gaze is conjugate oropharynx is clear neck is supple with good range of motion she has good range of motion of all 4 extremities.  She has no edema.  Her heart is tacky but regular in the 90s distant S1-S2 without murmur gallop or rub lungs are clear to auscultation abdomen is soft without again a megaly mass or tenderness skin is warm and dry except as below:  Patient has heavily excoriated scabbed skin which is exactly unilateral.  It is from the spinal processes rightward toward the tip of the chin.  There is absolutely no rash on the left side.  It is fairly sharply demarcated but is not isolated to a single dermatome.  Rather it is C3 and 4 and perhaps C5 with some anterior shoulder involvement as well.  At this point it is so heavily excoriated that it is lichenified between scabs.  There are no \"bumps\" which she saw when it first erupted sometime ago.  The rash again is fairly sharply demarcated on the upper margin from the tip of her chin to the earlobe spreads down onto the neck and onto the anterior shoulder.  During our time together I can see her rubbing or scratching it lightly when she is not even aware of doing so.      Due to the 2020 Covid 19 pandemic, except as noted above, the patient was visually observed at a 6 foot plus distance.  An observational exam was performed in an effort to keep patient safe from Covid 19 and other communicable diseases.   Labs:  Lab Results   Component Value Date    WBC 6.2 01/26/2021    HGB 9.4 (L) 01/26/2021    HCT 28.4 (L) 01/26/2021    MCV 79 (L) " 01/26/2021     01/26/2021     Results for orders placed or performed in visit on 01/26/21   Basic Metabolic Panel   Result Value Ref Range    Sodium 131 (L) 136 - 145 mmol/L    Potassium 4.1 3.5 - 5.0 mmol/L    Chloride 99 98 - 107 mmol/L    CO2 23 22 - 31 mmol/L    Anion Gap, Calculation 9 5 - 18 mmol/L    Glucose 97 70 - 125 mg/dL    Calcium 9.1 8.5 - 10.5 mg/dL    BUN 37 (H) 8 - 28 mg/dL    Creatinine 1.83 (H) 0.60 - 1.10 mg/dL    GFR MDRD Af Amer 32 (L) >60 mL/min/1.73m2    GFR MDRD Non Af Amer 26 (L) >60 mL/min/1.73m2         Lab Results   Component Value Date    TSH 1.09 08/26/2019     No results found for: HGBA1C  [unfilled]  Lab Results   Component Value Date    LAIAUHLZ98 361 08/26/2019     No results found for: BNP  @EverypostTX@        Invalid input(s): PRINTERVAL       Complete Blood Count -no Diff (01/21/2021 11:44 AM CST)  Complete Blood Count -no Diff (01/21/2021 11:44 AM CST)   Component Value Ref Range Performed At Pathologist Signature   WBC 5.5 3.5 - 10.5 x10(9)/L [facility]     RBC 3.62 (L) 3.90 - 5.03 x10(12)/L [facility]     Hemoglobin 9.5 (L) 12.0 - 15.5 g/dL [facility]     HCT 29.8 (L) 34.9 - 44.5 % [facility]     MCV 82.3 80.0 - 100.0 fL [facility]     MCH 26.2 (L) 27.6 - 33.3 pg [facility]     MCHC 31.9 31.5 - 35.2 g/dL [facility]     RDW 15.9 (H) 11.9 - 15.5 % [facility]     Platelets 189 150 - 450 x10(9)/L [facility]     Automated NRBC 0 <=0 /100 WBC [facility]       Complete Blood Count -no Diff (01/21/2021 11:44 AM CST)   Specimen   Blood     Complete Blood Count -no Diff (01/21/2021 11:44 AM CST)   Performing Organization Address City/State/ZIP Code Phone Number   [address] [address]   [phone]     Back to top of Lab Results       Basic Metabolic Panel (01/21/2021 11:44 AM CST)  Basic Metabolic Panel (01/21/2021 11:44 AM CST)   Component Value Ref Range Performed At Pathologist  "Signature   Sodium 133 (L) 136 - 145 mmol/L [facility]     Potassium 4.3 3.5 - 5.1 mmol/L [facility]     Chloride 99 98 - 109 mmol/L [facility]     CO2 25 20 - 29 mmol/L [facility]     Anion Gap 9 7 - 16 mmol/L [facility]     Calcium 11.1 (H) 8.4 - 10.4 mg/dL [facility]     BUN 37 (H) 7 - 26 mg/dL [facility]     Creatinine 1.66 (H) 0.55 - 1.02 mg/dL [facility]     GFR, Estimated 28 (L) >60 mL/min/1.73m2 [facility]     Glucose 111 (H)Comment: The given reference range is for the fasting state. Non-fasting reference range for glucose is 70 - 180 mg/dL. 70 - 100 mg/dL [facility]        Assessment/Plan:      ICD-10-CM    1. unilateral rash  R21    2. FTT (failure to thrive) in adult  R62.7        Failure to thrive  Generalized weakness   Etiology is unclear.  Pneumonia has been suspected and she has received treatment for it.  It sounds like the pandemic has not done her any good with isolation and decreased activity.  -PT, OT,  involvement.  Dietitian consulted.  Cognitive assessment will be appropriate as well.    Community-acquired pneumonia   Has completed her antibiotic therapy.  \".   Result Narrative   EXAM: XR PORTABLE CHEST 1 VIEW  LOCATION: [facility]  DATE/TIME: 1/21/2021 12:35 PM    INDICATION: Generalized weakness  COMPARISON: 04/04/2020    IMPRESSION: Mild nasal predominant interstitial infiltrate. Stable biapical scarring. Heart size is normal. No pleural effusions. Atherosclerotic change of aorta. Marked arthritic change in both shoulders.   Other Result Information   Interface, In Rad Results - 01/21/2021 12:46 PM CST  EXAM: XR PORTABLE CHEST 1 VIEW  LOCATION: [facility]  DATE/TIME: 1/21/2021 12:35 PM    INDICATION: Generalized weakness  COMPARISON: 04/04/2020    IMPRESSION: Mild nasal predominant interstitial infiltrate. Stable biapical scarring. Heart size is normal. No pleural effusions. Atherosclerotic change of " aorta. Marked arthritic change in both shoulders.     -No respiratory symptoms at this time.  No additional work-up or treatment anticipated.    GERD   Patient reports that she was hospitalized for this a year ago and has been on PPI since without any recurrent problems.  She also takes Pepcid.  Unclear if she needs both lifelong but I did not make any changes today.    Rash   The most distinctive feature is its completely unilateral distribution.  Another distinctive feature is relatively sharp smooth demarcation especially at the upper margin from the tip of the chin to the lobe of the ear.  The next feature is heavy excoriation including multiple scabs of all sorts of different sizes and shapes.  The next feature is lichenification especially in the anterior shoulder and on top of the shoulder.  There is very mild erythema here but I do not think we are dealing with secondary bacterial infection that we need to watch for that    Patient continues to have a tingling itching sensation which could be a variation of postherpetic neuralgia.    Despite the unusual story, I think shingles with postherpetic neuralgia and unrelenting itch scratch cycle is a distinct possibility here.  When she describes the early part of the rash it does sound like she was dealing with small blisters that did weep.  It is in adjacent dermatomes C3-4 and probably 5 which is not rare though atypical.    Other unusual unilateral rashes exist but seem less likely based on what we can see and hear about the original presentation.  -Have asked the staff to try to move her dermatology appointment up from the March 8 date.  -No expected benefit to antiviral therapy at this point.  -Postherpetic neuralgia treatment with gabapentin could be an option although she is not having any pain is just more of an itching sensation.  Nonetheless if we cannot stop the itch scratch cycle it may be worth a try.  We will see what dermatology has to say as  well.  -Continue topical steroid and Bactroban therapy as current  -Discussed itch scratch cycle and the need to leave the rash alone  -Continue clinical monitoring for signs of secondary bacterial infection    Chronic opiate dependent pain  Lumbosacral spine disease 3-4-5   I do not have primary documentation about the lumbar disease.  She says it is quite chronic and has not changed.  She feels she really needs to stay on the tramadol though we could offer alternative opiate if we do need to stop it for the hyponatremia which is reassuring to her.  I did not stop it today.    Hyponatremia   She has a history of normal sodiums until January 21 in the emergency room at 133.  She is now down to 131.  Her creatinine has increased from 1.66-1.83.    I do not know the cause of her hyponatremia at this time.  I think hypovolemic hyponatremia is a strong consideration especially with her worsening renal function.  Development of SIADH is always a consideration as well.  -Urine sodium urine osmolarity serum osmolarity ordered  -We will repeat BMP with the above labs.  If labs not consistent with SIADH, saline challenge could be done even here in the facility.  If looking SIADH like, would need to consider fluid restriction etc.  Might need more investigation for underlying cause as well.  -Consider tramadol suspect but just seems unlikely since he has been on it for a year.    CKD 3   Appears to be the case based on prior creatinine readings.  Indeed she is even been into the CKD 4 range at times although that appears to be with hospitalization, and more likely reflects some acute kidney injury at that time in April 2020.    Other   Added MiraLAX for bowels if need be    Insomnia   Takes trazodone 150 mg at bedtime    Dissatisfaction with facility   Patient really does not want to be here.  She agrees to stay for assessments today but she says she intends to talk with her daughter about getting out as soon as  possible          Case discussed with:    Facility staff             Brown Gao MD     [employer]

## 2022-02-08 NOTE — ED ADULT TRIAGE NOTE - CHIEF COMPLAINT QUOTE
Pt presents to ED c/o laceration to chin and pain to left thumb and neck s/p mechanical fall, no LOC, no use of blood thinners. Last Tdap UTD.

## 2022-02-08 NOTE — ED PROVIDER NOTE - PATIENT PORTAL LINK FT
You can access the FollowMyHealth Patient Portal offered by [facility] by registering at the following website: [email or web address]. By joining [employer]’s FollowMyHealth portal, you will also be able to view your health information using other applications (apps) compatible with our system.

## 2022-02-08 NOTE — ED PROVIDER NOTE - CARE PLAN
1 Principal Discharge DX:	Fall  Secondary Diagnosis:	Diphtheria-pertussis-tetanus (DPT) vaccination administered at current visit  Secondary Diagnosis:	Closed head injury  Secondary Diagnosis:	Laceration of chin  Secondary Diagnosis:	Neck pain

## 2022-02-08 NOTE — ED PROVIDER NOTE - NSFOLLOWUPINSTRUCTIONS_ED_ALL_ED_FT
Follow up with your primary care doctor   You can take 1000mg of tylenol every 6 hours as needed   Ice for swelling as needed (5-10 minutes every hour)  Keep it elevated to reduce swelling   Keep the splint on for comfort  Return immediately for any new or worsening symptoms or any new concerns

## 2022-02-18 ENCOUNTER — TRANSCRIPTION ENCOUNTER (OUTPATIENT)
Age: 77
End: 2022-02-18

## 2022-03-03 ENCOUNTER — EMERGENCY (EMERGENCY)
Facility: HOSPITAL | Age: 77
LOS: 1 days | Discharge: HOME | End: 2022-03-03
Admitting: EMERGENCY MEDICINE
Payer: MEDICARE

## 2022-03-03 VITALS
WEIGHT: 125 LBS | DIASTOLIC BLOOD PRESSURE: 83 MMHG | SYSTOLIC BLOOD PRESSURE: 144 MMHG | HEIGHT: 65 IN | TEMPERATURE: 97 F | HEART RATE: 75 BPM | OXYGEN SATURATION: 97 % | RESPIRATION RATE: 18 BRPM

## 2022-03-03 VITALS
DIASTOLIC BLOOD PRESSURE: 74 MMHG | TEMPERATURE: 98 F | HEART RATE: 76 BPM | OXYGEN SATURATION: 98 % | SYSTOLIC BLOOD PRESSURE: 125 MMHG | RESPIRATION RATE: 18 BRPM
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
## 2022-03-03 DIAGNOSIS — R51.9 HEADACHE, UNSPECIFIED: ICD-10-CM
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
## 2022-03-03 DIAGNOSIS — Z98.890 OTHER SPECIFIED POSTPROCEDURAL STATES: Chronic | ICD-10-CM

## 2022-03-03 DIAGNOSIS — Z88.8 ALLERGY STATUS TO OTHER DRUGS, MEDICAMENTS AND BIOLOGICAL SUBSTANCES: ICD-10-CM

## 2022-03-03 DIAGNOSIS — S00.91XA ABRASION OF UNSPECIFIED PART OF HEAD, INITIAL ENCOUNTER: ICD-10-CM

## 2022-03-03 DIAGNOSIS — Z90.49 ACQUIRED ABSENCE OF OTHER SPECIFIED PARTS OF DIGESTIVE TRACT: Chronic | ICD-10-CM
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
## 2022-03-03 DIAGNOSIS — Z88.5 ALLERGY STATUS TO NARCOTIC AGENT: ICD-10-CM
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
## 2022-03-03 PROCEDURE — 73562 X-RAY EXAM OF KNEE 3: CPT | Mod: 26,LT

## 2022-03-03 PROCEDURE — 70450 CT HEAD/BRAIN W/O DYE: CPT | Mod: 26,MH

## 2022-03-03 PROCEDURE — 99284 EMERGENCY DEPT VISIT MOD MDM: CPT | Mod: 25

## 2022-03-03 NOTE — ED ADULT NURSE NOTE - NSIMPLEMENTINTERV_GEN_ALL_ED
Implemented All Fall Risk Interventions:  [place] to call system. Call bell, personal items and telephone within reach. Instruct patient to call for assistance. Room bathroom lighting operational. Non-slip footwear when patient is off stretcher. Physically safe environment: no spills, clutter or unnecessary equipment. Stretcher in lowest position, wheels locked, appropriate side rails in place. Provide visual cue, wrist band, yellow gown, etc. Monitor gait and stability. Monitor for mental status changes and reorient to person, place, and time. Review medications for side effects contributing to fall risk. Reinforce activity limits and safety measures with patient and family.

## 2022-03-03 NOTE — ED PROVIDER NOTE - CLINICAL SUMMARY MEDICAL DECISION MAKING FREE TEXT BOX
75 y/o F presents to the ED for evaluation s/p trip and fall. Exam is remarkable for an abrasion to the top of the head. Plan for XR knee and CT head. Will reassess clinically after results have been obtained.

## 2022-03-03 NOTE — ED PROVIDER NOTE - PATIENT PORTAL LINK FT
You can access the FollowMyHealth Patient Portal offered by [facility] by registering at the following website: [email or web address]. By joining [employer]’s FollowMyHealth portal, you will also be able to view your health information using other applications (apps) compatible with our system.

## 2022-03-03 NOTE — ED ADULT TRIAGE NOTE - CHIEF COMPLAINT QUOTE
Patient reports cc of trip and fall. Patient states she was walking this afternoon with  s/p cataracts surgery to left eye. Patient states she was feeling unsteady after anesthesia and did not hold onto her . Patient reports tripping over her foot in the sidewalk. Patient denies LOC.  witnessed fall. Patient denies taking blood thinners. Patient ambulated into the ED with steady gait.

## 2022-03-03 NOTE — ED PROVIDER NOTE - OBJECTIVE STATEMENT
75 y/o F with PMH of pancreatitis presents to the ED for evaluation s/p fall. Pt reports she had cataract surgery earlier in the day. She was walking outside when she tripped and fell on the sidewalk with head strike. Pt did not have LOC. She denies CP, SOB, N/V, blurred vision, numbness, tingling, weakness, or any additional injuries.

## 2022-03-03 NOTE — ED ADULT NURSE NOTE - OBJECTIVE STATEMENT
Pt presents c/o mechanical trip and fall.  Pt denies head injury.  Pt denies use of AC.  Pt denies pain at this time.  Pt is drowsy, endorses [facility] anesthesia for cataract surgery today.  Pt ambulates w/ steady gait, no neuro deficit noted.  Pt pending eval by [facility].

## 2022-03-17 ENCOUNTER — APPOINTMENT (OUTPATIENT)
Dept: ORTHOPEDIC SURGERY | Facility: CLINIC | Age: 77
End: 2022-03-17

## 2022-05-24 ENCOUNTER — NON-APPOINTMENT (OUTPATIENT)
Age: 77
End: 2022-05-24

## 2022-08-11 ENCOUNTER — APPOINTMENT (OUTPATIENT)
Dept: OPHTHALMOLOGY | Facility: CLINIC | Age: 77
End: 2022-08-11

## 2022-08-11 ENCOUNTER — NON-APPOINTMENT (OUTPATIENT)
Age: 77
End: 2022-08-11

## 2022-08-11 PROCEDURE — 92004 COMPRE OPH EXAM NEW PT 1/>: CPT

## 2022-08-11 PROCEDURE — 92134 CPTRZ OPH DX IMG PST SGM RTA: CPT

## 2022-08-11 PROCEDURE — 92201 OPSCPY EXTND RTA DRAW UNI/BI: CPT

## 2023-01-22 ENCOUNTER — EMERGENCY (EMERGENCY)
Facility: HOSPITAL | Age: 78
LOS: 1 days | Discharge: HOME | End: 2023-01-22
Admitting: EMERGENCY MEDICINE
Payer: MEDICARE

## 2023-01-22 VITALS
TEMPERATURE: 98 F | OXYGEN SATURATION: 98 % | DIASTOLIC BLOOD PRESSURE: 71 MMHG | RESPIRATION RATE: 16 BRPM | SYSTOLIC BLOOD PRESSURE: 120 MMHG | HEART RATE: 64 BPM

## 2023-01-22 VITALS
SYSTOLIC BLOOD PRESSURE: 117 MMHG | TEMPERATURE: 99 F | OXYGEN SATURATION: 99 % | WEIGHT: 123.02 LBS | HEIGHT: 64 IN | HEART RATE: 67 BPM | RESPIRATION RATE: 18 BRPM | DIASTOLIC BLOOD PRESSURE: 74 MMHG

## 2023-01-22 DIAGNOSIS — Z98.890 OTHER SPECIFIED POSTPROCEDURAL STATES: Chronic | ICD-10-CM

## 2023-01-22 DIAGNOSIS — Z90.49 ACQUIRED ABSENCE OF OTHER SPECIFIED PARTS OF DIGESTIVE TRACT: Chronic | ICD-10-CM

## 2023-01-22 PROCEDURE — 99284 EMERGENCY DEPT VISIT MOD MDM: CPT

## 2023-01-22 PROCEDURE — 71250 CT THORAX DX C-: CPT | Mod: 26,MH

## 2023-01-22 RX ORDER — LIDOCAINE 4 G/100G
1 CREAM TOPICAL ONCE
Refills: 0 | Status: COMPLETED | OUTPATIENT
Start: 2023-01-22 | End: 2023-01-22

## 2023-01-22 RX ORDER — KETOROLAC TROMETHAMINE 30 MG/ML
30 SYRINGE (ML) INJECTION ONCE
Refills: 0 | Status: DISCONTINUED | OUTPATIENT
Start: 2023-01-22 | End: 2023-01-22

## 2023-01-22 RX ADMIN — LIDOCAINE 1 PATCH: 4 CREAM TOPICAL at 20:51

## 2023-01-22 RX ADMIN — Medication 30 MILLIGRAM(S): at 20:51

## 2023-01-22 NOTE — ED ADULT TRIAGE NOTE - CHIEF COMPLAINT QUOTE
Pt walk in complaining of three days of rib pain after getting out of a pool. States pain has only gotten worse. Hx of osteoporosis. Pt walk in complaining of three days of R sided rib pain after getting out of a pool. States pain has only gotten worse. Hx of osteoporosis.

## 2023-01-22 NOTE — ED ADULT NURSE NOTE - NSIMPLEMENTINTERV_GEN_ALL_ED
Implemented All Universal Safety Interventions:  [place] to call system. Call bell, personal items and telephone within reach. Instruct patient to call for assistance. Room bathroom lighting operational. Non-slip footwear when patient is off stretcher. Physically safe environment: no spills, clutter or unnecessary equipment. Stretcher in lowest position, wheels locked, appropriate side rails in place.

## 2023-01-22 NOTE — ED PROVIDER NOTE - CLINICAL SUMMARY MEDICAL DECISION MAKING FREE TEXT BOX
pt presents with c/o right rib pain after pressing her chest into the side of a pool. h/o osteoporosis and concerned for rib fracture. no sob. will obtain CT chest to evaluate for rib fractures, hemothorax, pneumothorax, etc. will give lidocaine patch and toradol IM for analgesia. radiology resident called and notes single nondisplaced fracture of right 8th rib without underlying lung pathology or injury. pt does not want to wait for report. will d/c with the understanding this is a prelim read and she can come  the official read tomorrow. left without paperwork.

## 2023-01-22 NOTE — ED PROVIDER NOTE - OBJECTIVE STATEMENT
77-year-old female with history of osteoporosis, and frequent UTIs presents to the emergency department today complaining of right lower rib pain onset 3 days ago while exiting a pool.  Patient notes she pushed herself up on the side of the pool using her arms and pressed her chest into the side of the pool to exit and felt pain.  She notes that she has had increased pain since then.  She denies shortness of breath, but admits that taking deep breaths worsens her pain.  She denies fever chills cough wheezing hemoptysis abdominal pain nausea vomiting or any other symptoms.  She has taken one quarter of an unknown pain pill earlier today with some temporary relief.

## 2023-01-22 NOTE — ED PROVIDER NOTE - MUSCULOSKELETAL, MLM
Spine appears normal, range of motion is not limited, + right lower anterolateral rib pain without crepitus or hematoma, no flail chest. + very faint bruising over 8th rib.

## 2023-01-22 NOTE — ED PROVIDER NOTE - PATIENT PORTAL LINK FT
You can access the FollowMyHealth Patient Portal offered by [facility] by registering at the following website: [email or web address]. By joining [employer]’s FollowMyHealth portal, you will also be able to view your health information using other applications (apps) compatible with our system.

## 2023-01-22 NOTE — ED ADULT NURSE NOTE - CHIEF COMPLAINT QUOTE
Pt walk in complaining of three days of R sided rib pain after getting out of a pool. States pain has only gotten worse. Hx of osteoporosis.

## 2023-01-24 DIAGNOSIS — Z87.19 PERSONAL HISTORY OF OTHER DISEASES OF THE DIGESTIVE SYSTEM: ICD-10-CM
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
## 2023-01-24 DIAGNOSIS — Z98.890 OTHER SPECIFIED POSTPROCEDURAL STATES: ICD-10-CM

## 2023-01-24 DIAGNOSIS — S22.31XA FRACTURE OF ONE RIB, RIGHT SIDE, INITIAL ENCOUNTER FOR CLOSED FRACTURE: ICD-10-CM

## 2023-01-24 DIAGNOSIS — Z90.49 ACQUIRED ABSENCE OF OTHER SPECIFIED PARTS OF DIGESTIVE TRACT: ICD-10-CM
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
## 2023-01-24 DIAGNOSIS — G47.30 SLEEP APNEA, UNSPECIFIED: ICD-10-CM

## 2023-01-24 DIAGNOSIS — Z88.5 ALLERGY STATUS TO NARCOTIC AGENT: ICD-10-CM

## 2023-01-24 DIAGNOSIS — Z87.440 PERSONAL HISTORY OF URINARY (TRACT) INFECTIONS: ICD-10-CM

## 2023-01-24 DIAGNOSIS — Z88.8 ALLERGY STATUS TO OTHER DRUGS, MEDICAMENTS AND BIOLOGICAL SUBSTANCES: ICD-10-CM

## 2023-01-24 DIAGNOSIS — R07.81 PLEURODYNIA: ICD-10-CM

## 2023-01-24 DIAGNOSIS — M81.0 AGE-RELATED OSTEOPOROSIS WITHOUT CURRENT PATHOLOGICAL FRACTURE: ICD-10-CM

## 2023-02-24 NOTE — ED ADULT NURSE NOTE - PAIN: BODY LOCATION
flank/Bilateral: Methotrexate Counseling:  Patient counseled regarding adverse effects of methotrexate including but not limited to nausea, vomiting, abnormalities in liver function tests. Patients may develop mouth sores, rash, diarrhea, and abnormalities in blood counts. The patient understands that monitoring is required including LFT's and blood counts.  There is a rare possibility of scarring of the liver and lung problems that can occur when taking methotrexate. Persistent nausea, loss of appetite, pale stools, dark urine, cough, and shortness of breath should be reported immediately. Patient advised to discontinue methotrexate treatment at least three months before attempting to become pregnant.  I discussed the need for folate supplements while taking methotrexate.  These supplements can decrease side effects during methotrexate treatment. The patient verbalized understanding of the proper use and possible adverse effects of methotrexate.  All of the patient's questions and concerns were addressed.

## 2023-03-14 ENCOUNTER — APPOINTMENT (OUTPATIENT)
Dept: OPHTHALMOLOGY | Facility: CLINIC | Age: 78
End: 2023-03-14
Payer: MEDICARE

## 2023-03-14 ENCOUNTER — NON-APPOINTMENT (OUTPATIENT)
Age: 78
End: 2023-03-14

## 2023-03-14 PROCEDURE — 92014 COMPRE OPH EXAM EST PT 1/>: CPT

## 2023-03-16 ENCOUNTER — OUTPATIENT (OUTPATIENT)
Dept: OUTPATIENT SERVICES | Facility: HOSPITAL | Age: 78
LOS: 1 days | End: 2023-03-16

## 2023-03-16 ENCOUNTER — APPOINTMENT (OUTPATIENT)
Dept: OPHTHALMOLOGY | Facility: CLINIC | Age: 78
End: 2023-03-16

## 2023-03-16 ENCOUNTER — APPOINTMENT (OUTPATIENT)
Dept: RADIOLOGY | Facility: CLINIC | Age: 78
End: 2023-03-16
Payer: MEDICARE

## 2023-03-16 DIAGNOSIS — Z90.49 ACQUIRED ABSENCE OF OTHER SPECIFIED PARTS OF DIGESTIVE TRACT: Chronic | ICD-10-CM

## 2023-03-16 DIAGNOSIS — Z98.890 OTHER SPECIFIED POSTPROCEDURAL STATES: Chronic | ICD-10-CM

## 2023-03-16 PROCEDURE — 77080 DXA BONE DENSITY AXIAL: CPT | Mod: 26

## 2023-03-17 ENCOUNTER — APPOINTMENT (OUTPATIENT)
Dept: OPHTHALMOLOGY | Facility: CLINIC | Age: 78
End: 2023-03-17
Payer: MEDICARE

## 2023-03-17 ENCOUNTER — NON-APPOINTMENT (OUTPATIENT)
Age: 78
End: 2023-03-17

## 2023-03-17 PROCEDURE — 92083 EXTENDED VISUAL FIELD XM: CPT

## 2023-03-17 PROCEDURE — 92014 COMPRE OPH EXAM EST PT 1/>: CPT

## 2023-03-21 ENCOUNTER — NON-APPOINTMENT (OUTPATIENT)
Age: 78
End: 2023-03-21

## 2023-03-21 ENCOUNTER — APPOINTMENT (OUTPATIENT)
Dept: OPHTHALMOLOGY | Facility: CLINIC | Age: 78
End: 2023-03-21
Payer: MEDICARE

## 2023-03-21 PROCEDURE — 92014 COMPRE OPH EXAM EST PT 1/>: CPT

## 2023-05-16 ENCOUNTER — APPOINTMENT (OUTPATIENT)
Dept: OPHTHALMOLOGY | Facility: CLINIC | Age: 78
End: 2023-05-16
Payer: MEDICARE

## 2023-05-16 ENCOUNTER — NON-APPOINTMENT (OUTPATIENT)
Age: 78
End: 2023-05-16

## 2023-05-16 PROCEDURE — 92012 INTRM OPH EXAM EST PATIENT: CPT

## 2023-06-13 ENCOUNTER — APPOINTMENT (OUTPATIENT)
Dept: OTOLARYNGOLOGY | Facility: CLINIC | Age: 78
End: 2023-06-13
Payer: SELF-PAY

## 2023-06-13 PROCEDURE — 92591 HEARING AID EXAMINATION & SELECTION BINAURAL: CPT

## 2023-06-29 ENCOUNTER — NON-APPOINTMENT (OUTPATIENT)
Age: 78
End: 2023-06-29

## 2023-06-29 ENCOUNTER — APPOINTMENT (OUTPATIENT)
Dept: OPHTHALMOLOGY | Facility: CLINIC | Age: 78
End: 2023-06-29
Payer: MEDICARE

## 2023-06-29 PROCEDURE — 92012 INTRM OPH EXAM EST PATIENT: CPT

## 2023-08-01 ENCOUNTER — APPOINTMENT (OUTPATIENT)
Dept: OPHTHALMOLOGY | Facility: CLINIC | Age: 78
End: 2023-08-01

## 2023-08-08 ENCOUNTER — EMERGENCY (EMERGENCY)
Facility: HOSPITAL | Age: 78
LOS: 1 days | Discharge: HOME | End: 2023-08-08
Attending: EMERGENCY MEDICINE | Admitting: EMERGENCY MEDICINE
Payer: MEDICARE

## 2023-08-08 VITALS
OXYGEN SATURATION: 98 % | SYSTOLIC BLOOD PRESSURE: 125 MMHG | RESPIRATION RATE: 18 BRPM | DIASTOLIC BLOOD PRESSURE: 74 MMHG | TEMPERATURE: 98 F | HEART RATE: 82 BPM

## 2023-08-08 VITALS
DIASTOLIC BLOOD PRESSURE: 81 MMHG | SYSTOLIC BLOOD PRESSURE: 155 MMHG | WEIGHT: 123.02 LBS | HEART RATE: 67 BPM | OXYGEN SATURATION: 97 % | HEIGHT: 64 IN | RESPIRATION RATE: 15 BRPM | TEMPERATURE: 98 F

## 2023-08-08 DIAGNOSIS — Z88.8 ALLERGY STATUS TO OTHER DRUGS, MEDICAMENTS AND BIOLOGICAL SUBSTANCES: ICD-10-CM

## 2023-08-08 DIAGNOSIS — R42 DIZZINESS AND GIDDINESS: ICD-10-CM

## 2023-08-08 DIAGNOSIS — F32.A DEPRESSION, UNSPECIFIED: ICD-10-CM

## 2023-08-08 DIAGNOSIS — F41.9 ANXIETY DISORDER, UNSPECIFIED: ICD-10-CM

## 2023-08-08 DIAGNOSIS — Z98.890 OTHER SPECIFIED POSTPROCEDURAL STATES: Chronic | ICD-10-CM

## 2023-08-08 DIAGNOSIS — Z90.49 ACQUIRED ABSENCE OF OTHER SPECIFIED PARTS OF DIGESTIVE TRACT: Chronic | ICD-10-CM

## 2023-08-08 DIAGNOSIS — Z88.5 ALLERGY STATUS TO NARCOTIC AGENT: ICD-10-CM

## 2023-08-08 DIAGNOSIS — F90.9 ATTENTION-DEFICIT HYPERACTIVITY DISORDER, UNSPECIFIED TYPE: ICD-10-CM

## 2023-08-08 LAB
ALBUMIN SERPL ELPH-MCNC: 3.6 G/DL — SIGNIFICANT CHANGE UP (ref 3.4–5)
ALP SERPL-CCNC: 68 U/L — SIGNIFICANT CHANGE UP (ref 40–120)
ALT FLD-CCNC: 22 U/L — SIGNIFICANT CHANGE UP (ref 12–42)
ANION GAP SERPL CALC-SCNC: 7 MMOL/L — LOW (ref 9–16)
AST SERPL-CCNC: 19 U/L — SIGNIFICANT CHANGE UP (ref 15–37)
BASOPHILS # BLD AUTO: 0.04 K/UL — SIGNIFICANT CHANGE UP (ref 0–0.2)
BASOPHILS NFR BLD AUTO: 0.7 % — SIGNIFICANT CHANGE UP (ref 0–2)
BILIRUB SERPL-MCNC: 0.4 MG/DL — SIGNIFICANT CHANGE UP (ref 0.2–1.2)
BUN SERPL-MCNC: 9 MG/DL — SIGNIFICANT CHANGE UP (ref 7–23)
CALCIUM SERPL-MCNC: 8.5 MG/DL — SIGNIFICANT CHANGE UP (ref 8.5–10.5)
CHLORIDE SERPL-SCNC: 99 MMOL/L — SIGNIFICANT CHANGE UP (ref 96–108)
CO2 SERPL-SCNC: 29 MMOL/L — SIGNIFICANT CHANGE UP (ref 22–31)
CREAT SERPL-MCNC: 0.86 MG/DL — SIGNIFICANT CHANGE UP (ref 0.5–1.3)
EGFR: 69 ML/MIN/1.73M2 — SIGNIFICANT CHANGE UP
EOSINOPHIL # BLD AUTO: 0.17 K/UL — SIGNIFICANT CHANGE UP (ref 0–0.5)
EOSINOPHIL NFR BLD AUTO: 2.8 % — SIGNIFICANT CHANGE UP (ref 0–6)
GLUCOSE BLDC GLUCOMTR-MCNC: 86 MG/DL — SIGNIFICANT CHANGE UP (ref 70–99)
GLUCOSE SERPL-MCNC: 93 MG/DL — SIGNIFICANT CHANGE UP (ref 70–99)
HCT VFR BLD CALC: 40.6 % — SIGNIFICANT CHANGE UP (ref 34.5–45)
HGB BLD-MCNC: 13.3 G/DL — SIGNIFICANT CHANGE UP (ref 11.5–15.5)
IMM GRANULOCYTES NFR BLD AUTO: 0.3 % — SIGNIFICANT CHANGE UP (ref 0–0.9)
LYMPHOCYTES # BLD AUTO: 1.58 K/UL — SIGNIFICANT CHANGE UP (ref 1–3.3)
LYMPHOCYTES # BLD AUTO: 26 % — SIGNIFICANT CHANGE UP (ref 13–44)
MAGNESIUM SERPL-MCNC: 2 MG/DL — SIGNIFICANT CHANGE UP (ref 1.6–2.6)
MCHC RBC-ENTMCNC: 30 PG — SIGNIFICANT CHANGE UP (ref 27–34)
MCHC RBC-ENTMCNC: 32.8 GM/DL — SIGNIFICANT CHANGE UP (ref 32–36)
MCV RBC AUTO: 91.6 FL — SIGNIFICANT CHANGE UP (ref 80–100)
MONOCYTES # BLD AUTO: 0.53 K/UL — SIGNIFICANT CHANGE UP (ref 0–0.9)
MONOCYTES NFR BLD AUTO: 8.7 % — SIGNIFICANT CHANGE UP (ref 2–14)
NEUTROPHILS # BLD AUTO: 3.74 K/UL — SIGNIFICANT CHANGE UP (ref 1.8–7.4)
NEUTROPHILS NFR BLD AUTO: 61.5 % — SIGNIFICANT CHANGE UP (ref 43–77)
NRBC # BLD: 0 /100 WBCS — SIGNIFICANT CHANGE UP (ref 0–0)
PLATELET # BLD AUTO: 184 K/UL — SIGNIFICANT CHANGE UP (ref 150–400)
POTASSIUM SERPL-MCNC: 4 MMOL/L — SIGNIFICANT CHANGE UP (ref 3.5–5.3)
POTASSIUM SERPL-SCNC: 4 MMOL/L — SIGNIFICANT CHANGE UP (ref 3.5–5.3)
PROT SERPL-MCNC: 7.4 G/DL — SIGNIFICANT CHANGE UP (ref 6.4–8.2)
RBC # BLD: 4.43 M/UL — SIGNIFICANT CHANGE UP (ref 3.8–5.2)
RBC # FLD: 13.5 % — SIGNIFICANT CHANGE UP (ref 10.3–14.5)
SODIUM SERPL-SCNC: 135 MMOL/L — SIGNIFICANT CHANGE UP (ref 132–145)
WBC # BLD: 6.08 K/UL — SIGNIFICANT CHANGE UP (ref 3.8–10.5)
WBC # FLD AUTO: 6.08 K/UL — SIGNIFICANT CHANGE UP (ref 3.8–10.5)

## 2023-08-08 PROCEDURE — 99284 EMERGENCY DEPT VISIT MOD MDM: CPT

## 2023-08-08 RX ORDER — SODIUM CHLORIDE 9 MG/ML
1000 INJECTION INTRAMUSCULAR; INTRAVENOUS; SUBCUTANEOUS ONCE
Refills: 0 | Status: COMPLETED | OUTPATIENT
Start: 2023-08-08 | End: 2023-08-08

## 2023-08-08 RX ADMIN — SODIUM CHLORIDE 2000 MILLILITER(S): 9 INJECTION INTRAMUSCULAR; INTRAVENOUS; SUBCUTANEOUS at 12:02

## 2023-08-08 NOTE — ED ADULT TRIAGE NOTE - CHIEF COMPLAINT QUOTE
sent from dentist for eval s/p bp reading of 143/93 repeat 149/94. c/o dizziness intermittent dizziness since 6/24 s/p receiving covid booster. . pmh of orthostatic hypotension (BEFAST-)

## 2023-08-08 NOTE — ED ADULT NURSE NOTE - NSFALLHARMRISKINTERV_ED_ALL_ED

## 2023-08-08 NOTE — ED PROVIDER NOTE - PATIENT PORTAL LINK FT
You can access the FollowMyHealth Patient Portal offered by [facility] by registering at the following website: [email or web address]. By joining [employer]’s FollowMyHealth portal, you will also be able to view your health information using other applications (apps) compatible with our system.

## 2023-08-08 NOTE — ED PROVIDER NOTE - CLINICAL SUMMARY MEDICAL DECISION MAKING FREE TEXT BOX
77-year-old female, lives independently at home without [facility] health aides, with past medical history of depression/anxiety, ADHD [on bupropion, trazodone, Modafinil], past surgical history of appendectomy, shoulder surgery, history of orthostatic hypotension, presents with  dizziness and "'not feeling well...it feels like my brain has been replaced" since she had her 4th COVID booster 2 weeks ago.  Patient went to her dentist this morning for a cleaning and had her BP checked and it was 143/93.  Repeat BP was done and was 149/94 and cleaning was canceled and patient was sent to the ED for further evaluation.  Denies chest pain, shortness of breath, headaches, abdominal pain, fevers, recent illness, ataxia, focal weakness, numbness.  No other complaints. Pt arrived ambulatory to ED with normal gait.    ED course:   Vital signs noted.  Patient mildly hypertensive with /81.  Afebrile.  Rest of vital signs within normal limits.  Will obtain blood work, ECG, fingerstick blood glucose.  Patient given IV fluids.  Will reevaluate. 77-year-old female, lives independently at home without [facility] health aides, with past medical history of depression/anxiety, ADHD [on bupropion, trazodone, Modafinil], past surgical history of appendectomy, shoulder surgery, history of orthostatic hypotension, presents with  dizziness and "'not feeling well...it feels like my brain has been replaced" since she had her 4th COVID booster 2 weeks ago.  Patient went to her dentist this morning for a cleaning and had her BP checked and it was 143/93.  Repeat BP was done and was 149/94 and cleaning was canceled and patient was sent to the ED for further evaluation.  Denies chest pain, shortness of breath, headaches, abdominal pain, fevers, recent illness, ataxia, focal weakness, numbness.  No other complaints. Pt arrived ambulatory to ED with normal gait.    ED course:   Vital signs noted.  Patient mildly hypertensive with /81.  Afebrile.  Rest of vital signs within normal limits. ECG with SR and with NAD.   Labs WNL.  Patient given IV fluids and feeling better.  Will reevaluate. 77-year-old female, lives independently at home without [facility] health aides, with past medical history of depression/anxiety, ADHD [on bupropion, trazodone, Modafinil], past surgical history of appendectomy, shoulder surgery, history of orthostatic hypotension, presents with  dizziness and "'not feeling well...it feels like my brain has been replaced" since she had her 4th COVID booster 2 weeks ago.  Patient went to her dentist this morning for a cleaning and had her BP checked and it was 143/93.  Repeat BP was done and was 149/94 and cleaning was canceled and patient was sent to the ED for further evaluation.  Denies chest pain, shortness of breath, headaches, abdominal pain, fevers, recent illness, ataxia, focal weakness, numbness.  No other complaints. Pt arrived ambulatory to ED with normal gait.    ED course:   Vital signs noted.  Patient mildly hypertensive with /81.  Afebrile.  Rest of vital signs within normal limits. ECG with SR and with NAD.   Labs WNL. Patient given IV fluids and feeling better. BP normalized to 125/74. Reassured. Non-toxic appearing and stable for discharge. To follow up outpatient. Strict return precautions given.

## 2023-08-08 NOTE — ED PROVIDER NOTE - CARE PROVIDERS DIRECT ADDRESSES
,[email or web address]

## 2023-08-08 NOTE — ED ADULT NURSE NOTE - GLASGOW COMA SCALE: BEST VERBAL RESPONSE
----- Message from Vania Mart sent at 9/16/2020  1:36 PM CDT -----  Contact: pt  Pt states on 9/2/20 the pt saw NP Gilberto on a virtual visit and he informed her that he was running out of his HYDROcodone-acetaminophen (NORCO)  mg per tablet and she states she was going to forward to the Dr a message. Pt states nurse Aixa 9/8 on the portal advised him his medication had been forwarded to the Dr and that the Dr has up to 72 hours to call in Rx. And this morning a message was sent on the protal by nurse Aguilera who advised him to call and schedule an appointment to see the Dr so he is calling to speak with the office to get a better understanding  cause he had saw a Dr/NP. Advised the pt that the Dr is not in today but will be in tomorrow....[phone]        .  [facility] [place] [id number]  [address] [address]  Phone: [phone] Fax: [phone]     (V5) oriented

## 2023-08-08 NOTE — ED ADULT NURSE NOTE - CHPI ED NUR SYMPTOMS POS
CHANGE IN LEVEL OF CONSCIOUSNESS/CONFUSION/DISORIENTATION/DIZZINESS/HEADACHE/NAUSEA/PAIN/PHOTOPHOBIA/SEIZURE

## 2023-08-08 NOTE — ED PROVIDER NOTE - NSFOLLOWUPINSTRUCTIONS_ED_ALL_ED_FT
Please follow up with your primary care doctor in 2-3 days and a Cardiologist in 3-7 days.   Return to the ER if you develop any concerning symptoms.     Dizziness    Dizziness can manifest as a feeling of unsteadiness or light-headedness. You may feel like you are about to faint. This condition can be caused by a number of things, including medicines, dehydration, or illness. Drink enough fluid to keep your urine clear or pale yellow. Do not drink alcohol and limit your caffeine intake. Avoid quick or sudden movements.  Rise slowly from chairs and steady yourself until you feel okay. In the morning, first sit up on the side of the bed.    SEEK IMMEDIATE MEDICAL CARE IF YOU HAVE ANY OF THE FOLLOWING SYMPTOMS: vomiting, changes in your vision or speech, weakness in your arms or legs, trouble speaking or swallowing, chest pain, abdominal pain, shortness of breath, sweating, bleeding, headache, neck pain, or fever.

## 2023-08-08 NOTE — ED ADULT NURSE NOTE - OBJECTIVE STATEMENT
pt presents from dentist appointment for evaluation of elevated blood pressure (systolic 149 mmHg) with intermittent dizziness that pt reports has been ongoing since after receiving her fourth COVID-19 vaccine (booster dose) last June 2023; pt denies any pain, SOB; reports hx orthostatic hypotension; pt interacting with staff appropriately; pt able to ambulate independently to bathroom with steady gait

## 2023-08-08 NOTE — ED ADULT TRIAGE NOTE - HEIGHT IN CM
162.56 Z Plasty Text: The lesion was extirpated to the level of the fat with a #15 scalpel blade.  Given the location of the defect, shape of the defect and the proximity to free margins a Z-plasty was deemed most appropriate for repair.  Using a sterile surgical marker, the appropriate transposition arms of the Z-plasty were drawn incorporating the defect and placing the expected incisions within the relaxed skin tension lines where possible.    The area thus outlined was incised deep to adipose tissue with a #15 scalpel blade.  The skin margins were undermined to an appropriate distance in all directions utilizing iris scissors.  The opposing transposition arms were then transposed into place in opposite direction and anchored with interrupted buried subcutaneous sutures.

## 2023-08-08 NOTE — ED PROVIDER NOTE - OBJECTIVE STATEMENT
77-year-old female, lives independently at home without [facility] health aides, with past medical history of depression/anxiety, ADHD [on bupropion, trazodone, Modafinil], past surgical history of appendectomy, shoulder surgery, history of orthostatic hypotension, presents with  dizziness and "'not feeling well...it feels like my brain has been replaced" since she had her 4th COVID booster 2 weeks ago.  Patient went to her dentist this morning for a cleaning and had her BP checked and it was 143/93.  Repeat BP was done and was 149/94 and cleaning was canceled and patient was sent to the ED for further evaluation.  Denies chest pain, shortness of breath, headaches, abdominal pain, fevers, recent illness, ataxia, focal weakness, numbness.  No other complaints. Pt arrived ambulatory to ED with normal gait. 77-year-old female, lives independently at home without home health aides, with past medical history of depression/anxiety, ADHD [on bupropion, trazodone, Modafinil], past surgical history of appendectomy, shoulder surgery, history of orthostatic hypotension, presents with  dizziness and "'not feeling well...it feels like my brain has been replaced" since she had her 4th COVID booster 2 weeks ago.  Patient went to her dentist this morning for a cleaning and had her BP checked and it was 143/93.  Repeat BP was done and was 149/94 and cleaning was canceled and patient was sent to the ED for further evaluation.  Denies chest pain, shortness of breath, headaches, abdominal pain, fevers, recent illness, ataxia, focal weakness, numbness.  No other complaints. Pt arrived ambulatory to ED with normal gait.

## 2023-08-08 NOTE — ED ADULT TRIAGE NOTE - AS HEIGHT TYPE
Patient Education     Eating Heart-Healthy Food: Using the DASH Plan    Eating for your heart doesn’t have to be hard or boring. You just need to know how to make healthier choices. The DASH eating plan has been developed to help you do just that. DASH stands for Dietary Approaches to Stop Hypertension. It is a plan that has been proven to be healthier for your heart and to lower your risk for high blood pressure. It can also help lower your risk for cancer, heart disease, osteoporosis, and diabetes.  Choosing from each food group  Choose foods from each of the food groups below each day. Try to get the recommended number of servings for each food group. The serving numbers are based on a diet of 2,000 calories a day. Talk with your healthcare provider if you’re not sure about your calorie needs. Along with getting the correct servings, the DASH plan also advises less than 2,300 mg of salt (sodium) per day. Lowering sodium intake to 1,500 mg per day lowers blood pressure even more. (There's about 2,300 mg of sodium in 1 teaspoon of salt.)      Grains  Servings: 6 to 8 a day  A serving is:  · 1 slice bread  · 1 ounce dry cereal  · Half a cup cooked rice, pasta or cereal  Best choices: Whole grains and any grains high in fiber. Vegetables  Servings: 4 to 5 a day  A serving is:  · 1 cup raw leafy vegetable  · Half a cup cut-up raw or cooked vegetable  · Half a cup vegetable juice  Best choices: Fresh or frozen vegetables prepared without added salt or fat.   Fruits  Servings: 4 to 5 a day  A serving is:  · 1 medium fruit  · One-quarter cup dried fruit  · Half a cup fresh, frozen, or canned fruit  · Half a cup of 100% fruit juices  Best choices: A variety of fresh fruits of different colors. Whole fruits are a better choice than fruit juices. Low-fat or fat-free dairy  Servings: 2 to 3 a day  A serving is:  · 1 cup milk  · 1 cup yogurt  · One and a half ounces cheese  Best choices: Skim or 1% milk, low-fat or fat-free  yogurt or buttermilk, and low-fat cheeses.         Lean meats, poultry, fish  Servings: 6 or fewer a day  A serving is:  · 1 ounce cooked meats, poultry, or fish  · 1 egg  Best choices: Lean poultry and fish. Trim away visible fat. Broil, grill, roast, or boil instead of frying. Remove skin from poultry before eating. Limit how much red meat you eat.  Nuts, seeds, beans  Servings: 4 to 5 a week  A serving is:  · One-third cup nuts (one and a half ounces)  · 2 tablespoons nut butter or seeds  · Half a cup cooked dry beans or legumes  Best choices: Dry roasted nuts with no salt added, lentils, kidney beans, garbanzo beans, and whole mendoza beans.   Fats and oils  Servings: 2 to 3 a day  A serving is:  · 1 teaspoon vegetable oil  · 1 teaspoon soft margarine  · 1 tablespoon mayonnaise  · 2 tablespoons salad dressing  Best choices: Nut and vegetable oils (nontropical vegetable oils), such as olive and canola oil. Sweets  Servings: 5 a week or fewer  A serving is:  · 1 tablespoon sugar, maple syrup, or honey  · 1 tablespoon jam or jelly  · 1 half-ounce jelly beans (about 15)  · 1 cup lemonade  Best choices: Dried fruit can be a satisfying sweet. Choose low-fat sweets. And watch your serving sizes!      For more on the DASH eating plan, visit:  www.nhlbi.nih.gov/health/health-topics/topics/dash   [employer] last reviewed this educational content on 7/1/2019 © 2000-2020 [employer]. [address] [place] [address], [place] [address]. All rights reserved. This information is not intended as a substitute for professional medical care. Always follow your healthcare professional's instructions.         dr ravi subramanian [phone] (eye doctor)    Scheduling [phone]   stated

## 2023-08-08 NOTE — ED ADULT NURSE NOTE - BREATH SOUNDS, MLM
Pediatric Well Child Exam: 2 Months of age    Chief Complaint   Patient presents with   • Well Infant Birth to 23 Months     2 month ck       SUBJECTIVE:  Steven Gallo is a 2 month old male who presents for a 2 month old well child exam.  Patient presents with Mother.    CONCERNS RAISED TODAY: per nurses notes  1-Head turn preference to right- sees chiropractor with improvement.  Discussed PT if stagnates with improvement.  2-BF-mom still never feels full on right breast, not sure if she gets output.  Pt feeds much better on left.  Weight gain is ok but she is wondering about long term.  Discussed return to lactation.    SLEEP PATTERN:  Hours / Night: usually 3 hours, up to 6 hours.      DIET/GI:  FEEDING:breast 8 times.   STOOLS: 4 / day.    /HOMECARE: none yet    FAMILY/HOME ENVIRONMENT:  Parental Adjustment: wnl  Sibling Adjustment: No Problems  Maternal Depression:  · Little interest or pleasure in doing anything:  []  YES    [x]  NO   · Feeling down, depressed or hopeless:         []  YES    [x]  NO   · [place]  Depression Scale (EPDS) completed- score of 6 is wnl/ does not qualify for risk of postpartum depression.    Parents working outside the home: father  Toxic Exposure:  Tobacco Use: Not Asked         DEVELOPMENT:  [x]  YES     []  NO     []  UNKNOWN   Social smile?  [x]  YES     []  NO     []  UNKNOWN   Holds head up some when held?  [x]  YES     []  NO     []  UNKNOWN   [place]?  [x]  YES     []  NO     []  UNKNOWN   Holds object if placed in hand?  [x]  YES     []  NO     []  UNKNOWN   Begins to push up when lying on tummy?  [x]  YES     []  NO     []  UNKNOWN   Makes eye contact and follows faces?  [x]  YES     []  NO     []  UNKNOWN   Reponds to voice/sound?    OBJECTIVE:  BIRTH HISTORY:  Birth History   • Birth     Length: 19\" (48.3 cm)     Weight: 2.8 kg (6 lb 2.8 oz)     HC 34 cm (13.39\")   • Apgar     One: 8     Five: 9   • Delivery Method: Vaginal, Spontaneous   • Gestation  Age: 39 wks   • Duration of Labor: 1st: 15h 38m / 2nd: 20m       FAMILY HISTORY:  No family history on file.  Review of patient's family status indicates:    Mother                         Alive                       Comment: Copied from mother's family history at                birth      PAST HISTORIES:  Past Medical History:   Diagnosis Date   • Sacral dimple in  2022     No current outpatient medications on file.     No current facility-administered medications for this visit.     ALLERGIES:  No Known Allergies    Allergies, Medications, Medical history, Surgical history, Family history reviewed and updated.    IMMUNIZATION REACTIONS: None  VARICELLA STATUS: non-immune    RECENT HEALTH EVENTS:  Illnesses: None.  Hospitalizations: None.  Injuries or Accidents: None.    PHYSICAL EXAM:      VITAL SIGNS:   Visit Vitals  Ht 23.4\" (59.4 cm)   Wt 4.54 kg (10 lb 0.1 oz)   HC 39 cm (15.35\")   BMI 12.85 kg/m²    2 %ile (Z= -2.13) based on WHO (Boys, 0-2 years) weight-for-age data using vitals from 2022.  GENERAL:  Well appearing male infant in no acute distress. Alert and consolable.  SKIN:  Warm, normal turgor.  No cyanosis.  No rash. Bluish-gray discoloration over buttocks/sacral area  HEAD: Normocephalic except right posterior flattening very mild compared to left, atraumatic. Anterior fontanel open, soft and flat.  EYES: Conjunctivae appear normal, non-injected, non-icteric, positive red reflex.  NOSE: Appears normal. No flaring.  EARS: Normal pinnae, no preauricular skin tags. Tympanic membranes are transparent with normal landmarks.  THROAT: Oropharynx with moist mucous membranes and no lesions.  NECK: Supple.  No lymphadenopathy or masses.  HEART: Regular rate and rhythm. Normal S1, S2. No murmurs.   LUNGS: Clear to auscultation . No wheezes, rales, rhonchi. Normal work effort with breathing.  ABDOMEN: Soft, nontender. No organomegaly or masses.  GENITOURINARY:  Marcelo stage 1. Bilateral testes  without mass/hernia. Rectum/anus patent.  EXTREMITIES:  Hips - normal range of motion. Negative Pedersen and Ortolani's. Equal femoral pulses.  SPINE: Spine appears straight. Normal sacrum.  NEUROLOGIC: Normal tone, bulk, strength.       Assessment:  2 month old male well infant with normal catch up growth and development  Mom does not feel milk on right- discussed return to lactation to see transfer.  At this point weight is on track but may start to need supplement.  Right sided head turn preference- discussed option of PT.  Already seeing chiropractor.  Macedonian spotting-discussed with mom.      Plan:  1. All parental concerns and questions discussed.  2. Anticipatory guidance provided, handout/s given Fever management  3. Sleep management  4. SIDS prevention  5. Accident Prevention: Car Seats, Crib, Water Heater Temperature  6. Delay solids until 4-6 months  7. Tobacco-free home  8. Vitamin D supplementation if breast feeding  9. Immunizations per orders- pediarix, prevnar, hib and rota #1.  Risks, benefits, and side effects discussed. Vaccine Information Statement for Immunizations given.    Follow up: Return in about 2 months (around 2022) for Well Child Check, sooner if needed.   Clear

## 2023-09-20 ENCOUNTER — NON-APPOINTMENT (OUTPATIENT)
Age: 78
End: 2023-09-20

## 2023-10-13 ENCOUNTER — OUTPATIENT (OUTPATIENT)
Dept: OUTPATIENT SERVICES | Facility: HOSPITAL | Age: 78
LOS: 1 days | End: 2023-10-13

## 2023-10-13 ENCOUNTER — APPOINTMENT (OUTPATIENT)
Dept: RADIOLOGY | Facility: CLINIC | Age: 78
End: 2023-10-13
Payer: MEDICARE

## 2023-10-13 DIAGNOSIS — Z98.890 OTHER SPECIFIED POSTPROCEDURAL STATES: Chronic | ICD-10-CM

## 2023-10-13 PROCEDURE — 71046 X-RAY EXAM CHEST 2 VIEWS: CPT | Mod: 26

## 2023-10-14 ENCOUNTER — OUTPATIENT (OUTPATIENT)
Dept: OUTPATIENT SERVICES | Facility: HOSPITAL | Age: 78
LOS: 1 days | End: 2023-10-14

## 2023-10-14 ENCOUNTER — APPOINTMENT (OUTPATIENT)
Dept: MRI IMAGING | Facility: CLINIC | Age: 78
End: 2023-10-14
Payer: MEDICARE

## 2023-10-14 DIAGNOSIS — Z98.890 OTHER SPECIFIED POSTPROCEDURAL STATES: Chronic | ICD-10-CM

## 2023-10-14 DIAGNOSIS — Z90.49 ACQUIRED ABSENCE OF OTHER SPECIFIED PARTS OF DIGESTIVE TRACT: Chronic | ICD-10-CM

## 2023-10-14 PROCEDURE — 70551 MRI BRAIN STEM W/O DYE: CPT | Mod: 26,MH

## 2023-11-30 NOTE — ED ADULT TRIAGE NOTE - DIRECT TO ROOM CARE INITIATED:
HF follow up call. Spoke with  Anitha's son Jose, main caregiver d/t her mild dementia. Daily weights are performed, doing best to follow low Na diet, takes medications as directed. Reviewed HF flare up symptoms to call MD with. Follow up appts scheduled with PCP,Cardiologist & Pulmonary. Anitha is active with [facility], Milana Hernadez CNP. I will continue to follow for CHF management.  
05-Oct-2021 09:34

## 2023-12-06 ENCOUNTER — INPATIENT (INPATIENT)
Facility: HOSPITAL | Age: 78
LOS: 0 days | Discharge: HOME | DRG: 92 | End: 2023-12-07
Attending: PSYCHIATRY & NEUROLOGY | Admitting: INTERNAL MEDICINE
Payer: COMMERCIAL

## 2023-12-06 VITALS
HEART RATE: 68 BPM | DIASTOLIC BLOOD PRESSURE: 86 MMHG | WEIGHT: 125 LBS | OXYGEN SATURATION: 96 % | HEIGHT: 64 IN | SYSTOLIC BLOOD PRESSURE: 144 MMHG | TEMPERATURE: 98 F | RESPIRATION RATE: 15 BRPM

## 2023-12-06 DIAGNOSIS — Z90.49 ACQUIRED ABSENCE OF OTHER SPECIFIED PARTS OF DIGESTIVE TRACT: Chronic | ICD-10-CM

## 2023-12-06 DIAGNOSIS — Z98.890 OTHER SPECIFIED POSTPROCEDURAL STATES: Chronic | ICD-10-CM

## 2023-12-06 LAB
ALBUMIN SERPL ELPH-MCNC: 3.6 G/DL — SIGNIFICANT CHANGE UP (ref 3.4–5)
ALBUMIN SERPL ELPH-MCNC: 3.6 G/DL — SIGNIFICANT CHANGE UP (ref 3.4–5)
ALP SERPL-CCNC: 86 U/L — SIGNIFICANT CHANGE UP (ref 40–120)
ALP SERPL-CCNC: 86 U/L — SIGNIFICANT CHANGE UP (ref 40–120)
ALT FLD-CCNC: 20 U/L — SIGNIFICANT CHANGE UP (ref 12–42)
ALT FLD-CCNC: 20 U/L — SIGNIFICANT CHANGE UP (ref 12–42)
ANION GAP SERPL CALC-SCNC: 5 MMOL/L — LOW (ref 9–16)
ANION GAP SERPL CALC-SCNC: 5 MMOL/L — LOW (ref 9–16)
APPEARANCE UR: CLEAR — SIGNIFICANT CHANGE UP
APPEARANCE UR: CLEAR — SIGNIFICANT CHANGE UP
APTT BLD: 28.4 SEC — SIGNIFICANT CHANGE UP (ref 24.5–35.6)
APTT BLD: 28.4 SEC — SIGNIFICANT CHANGE UP (ref 24.5–35.6)
AST SERPL-CCNC: 30 U/L — SIGNIFICANT CHANGE UP (ref 15–37)
AST SERPL-CCNC: 30 U/L — SIGNIFICANT CHANGE UP (ref 15–37)
BACTERIA # UR AUTO: PRESENT /HPF — SIGNIFICANT CHANGE UP
BACTERIA # UR AUTO: PRESENT /HPF — SIGNIFICANT CHANGE UP
BASOPHILS # BLD AUTO: 0.06 K/UL — SIGNIFICANT CHANGE UP (ref 0–0.2)
BASOPHILS # BLD AUTO: 0.06 K/UL — SIGNIFICANT CHANGE UP (ref 0–0.2)
BASOPHILS NFR BLD AUTO: 1 % — SIGNIFICANT CHANGE UP (ref 0–2)
BASOPHILS NFR BLD AUTO: 1 % — SIGNIFICANT CHANGE UP (ref 0–2)
BILIRUB SERPL-MCNC: 0.4 MG/DL — SIGNIFICANT CHANGE UP (ref 0.2–1.2)
BILIRUB SERPL-MCNC: 0.4 MG/DL — SIGNIFICANT CHANGE UP (ref 0.2–1.2)
BILIRUB UR-MCNC: NEGATIVE — SIGNIFICANT CHANGE UP
BILIRUB UR-MCNC: NEGATIVE — SIGNIFICANT CHANGE UP
BUN SERPL-MCNC: 14 MG/DL — SIGNIFICANT CHANGE UP (ref 7–23)
BUN SERPL-MCNC: 14 MG/DL — SIGNIFICANT CHANGE UP (ref 7–23)
CALCIUM SERPL-MCNC: 9.2 MG/DL — SIGNIFICANT CHANGE UP (ref 8.5–10.5)
CALCIUM SERPL-MCNC: 9.2 MG/DL — SIGNIFICANT CHANGE UP (ref 8.5–10.5)
CHLORIDE SERPL-SCNC: 101 MMOL/L — SIGNIFICANT CHANGE UP (ref 96–108)
CHLORIDE SERPL-SCNC: 101 MMOL/L — SIGNIFICANT CHANGE UP (ref 96–108)
CO2 SERPL-SCNC: 30 MMOL/L — SIGNIFICANT CHANGE UP (ref 22–31)
CO2 SERPL-SCNC: 30 MMOL/L — SIGNIFICANT CHANGE UP (ref 22–31)
COLOR SPEC: SIGNIFICANT CHANGE UP
COLOR SPEC: SIGNIFICANT CHANGE UP
COMMENT - URINE: SIGNIFICANT CHANGE UP
COMMENT - URINE: SIGNIFICANT CHANGE UP
CREAT SERPL-MCNC: 0.91 MG/DL — SIGNIFICANT CHANGE UP (ref 0.5–1.3)
CREAT SERPL-MCNC: 0.91 MG/DL — SIGNIFICANT CHANGE UP (ref 0.5–1.3)
DIFF PNL FLD: NEGATIVE — SIGNIFICANT CHANGE UP
DIFF PNL FLD: NEGATIVE — SIGNIFICANT CHANGE UP
EGFR: 64 ML/MIN/1.73M2 — SIGNIFICANT CHANGE UP
EGFR: 64 ML/MIN/1.73M2 — SIGNIFICANT CHANGE UP
EOSINOPHIL # BLD AUTO: 0.27 K/UL — SIGNIFICANT CHANGE UP (ref 0–0.5)
EOSINOPHIL # BLD AUTO: 0.27 K/UL — SIGNIFICANT CHANGE UP (ref 0–0.5)
EOSINOPHIL NFR BLD AUTO: 4.3 % — SIGNIFICANT CHANGE UP (ref 0–6)
EOSINOPHIL NFR BLD AUTO: 4.3 % — SIGNIFICANT CHANGE UP (ref 0–6)
EPI CELLS # UR: PRESENT
EPI CELLS # UR: PRESENT
FLUAV AG NPH QL: SIGNIFICANT CHANGE UP
FLUAV AG NPH QL: SIGNIFICANT CHANGE UP
FLUBV AG NPH QL: SIGNIFICANT CHANGE UP
FLUBV AG NPH QL: SIGNIFICANT CHANGE UP
GLUCOSE SERPL-MCNC: 104 MG/DL — HIGH (ref 70–99)
GLUCOSE SERPL-MCNC: 104 MG/DL — HIGH (ref 70–99)
GLUCOSE UR QL: NEGATIVE MG/DL — SIGNIFICANT CHANGE UP
GLUCOSE UR QL: NEGATIVE MG/DL — SIGNIFICANT CHANGE UP
HCT VFR BLD CALC: 42.6 % — SIGNIFICANT CHANGE UP (ref 34.5–45)
HCT VFR BLD CALC: 42.6 % — SIGNIFICANT CHANGE UP (ref 34.5–45)
HGB BLD-MCNC: 13.9 G/DL — SIGNIFICANT CHANGE UP (ref 11.5–15.5)
HGB BLD-MCNC: 13.9 G/DL — SIGNIFICANT CHANGE UP (ref 11.5–15.5)
HIV 1 & 2 AB SERPL IA.RAPID: SIGNIFICANT CHANGE UP
HIV 1 & 2 AB SERPL IA.RAPID: SIGNIFICANT CHANGE UP
IMM GRANULOCYTES NFR BLD AUTO: 0.3 % — SIGNIFICANT CHANGE UP (ref 0–0.9)
IMM GRANULOCYTES NFR BLD AUTO: 0.3 % — SIGNIFICANT CHANGE UP (ref 0–0.9)
INR BLD: 0.98 — SIGNIFICANT CHANGE UP (ref 0.85–1.18)
INR BLD: 0.98 — SIGNIFICANT CHANGE UP (ref 0.85–1.18)
KETONES UR-MCNC: NEGATIVE MG/DL — SIGNIFICANT CHANGE UP
KETONES UR-MCNC: NEGATIVE MG/DL — SIGNIFICANT CHANGE UP
LACTATE BLDV-MCNC: 0.6 MMOL/L — SIGNIFICANT CHANGE UP (ref 0.5–2)
LACTATE BLDV-MCNC: 0.6 MMOL/L — SIGNIFICANT CHANGE UP (ref 0.5–2)
LEUKOCYTE ESTERASE UR-ACNC: NEGATIVE — SIGNIFICANT CHANGE UP
LEUKOCYTE ESTERASE UR-ACNC: NEGATIVE — SIGNIFICANT CHANGE UP
LYMPHOCYTES # BLD AUTO: 2.14 K/UL — SIGNIFICANT CHANGE UP (ref 1–3.3)
LYMPHOCYTES # BLD AUTO: 2.14 K/UL — SIGNIFICANT CHANGE UP (ref 1–3.3)
LYMPHOCYTES # BLD AUTO: 33.9 % — SIGNIFICANT CHANGE UP (ref 13–44)
LYMPHOCYTES # BLD AUTO: 33.9 % — SIGNIFICANT CHANGE UP (ref 13–44)
MCHC RBC-ENTMCNC: 30.3 PG — SIGNIFICANT CHANGE UP (ref 27–34)
MCHC RBC-ENTMCNC: 30.3 PG — SIGNIFICANT CHANGE UP (ref 27–34)
MCHC RBC-ENTMCNC: 32.6 GM/DL — SIGNIFICANT CHANGE UP (ref 32–36)
MCHC RBC-ENTMCNC: 32.6 GM/DL — SIGNIFICANT CHANGE UP (ref 32–36)
MCV RBC AUTO: 92.8 FL — SIGNIFICANT CHANGE UP (ref 80–100)
MCV RBC AUTO: 92.8 FL — SIGNIFICANT CHANGE UP (ref 80–100)
MONOCYTES # BLD AUTO: 0.62 K/UL — SIGNIFICANT CHANGE UP (ref 0–0.9)
MONOCYTES # BLD AUTO: 0.62 K/UL — SIGNIFICANT CHANGE UP (ref 0–0.9)
MONOCYTES NFR BLD AUTO: 9.8 % — SIGNIFICANT CHANGE UP (ref 2–14)
MONOCYTES NFR BLD AUTO: 9.8 % — SIGNIFICANT CHANGE UP (ref 2–14)
NEUTROPHILS # BLD AUTO: 3.2 K/UL — SIGNIFICANT CHANGE UP (ref 1.8–7.4)
NEUTROPHILS # BLD AUTO: 3.2 K/UL — SIGNIFICANT CHANGE UP (ref 1.8–7.4)
NEUTROPHILS NFR BLD AUTO: 50.7 % — SIGNIFICANT CHANGE UP (ref 43–77)
NEUTROPHILS NFR BLD AUTO: 50.7 % — SIGNIFICANT CHANGE UP (ref 43–77)
NITRITE UR-MCNC: POSITIVE
NITRITE UR-MCNC: POSITIVE
NRBC # BLD: 0 /100 WBCS — SIGNIFICANT CHANGE UP (ref 0–0)
NRBC # BLD: 0 /100 WBCS — SIGNIFICANT CHANGE UP (ref 0–0)
PH UR: 8 — SIGNIFICANT CHANGE UP (ref 5–8)
PH UR: 8 — SIGNIFICANT CHANGE UP (ref 5–8)
PLATELET # BLD AUTO: 193 K/UL — SIGNIFICANT CHANGE UP (ref 150–400)
PLATELET # BLD AUTO: 193 K/UL — SIGNIFICANT CHANGE UP (ref 150–400)
POTASSIUM SERPL-MCNC: 4.9 MMOL/L — SIGNIFICANT CHANGE UP (ref 3.5–5.3)
POTASSIUM SERPL-MCNC: 4.9 MMOL/L — SIGNIFICANT CHANGE UP (ref 3.5–5.3)
POTASSIUM SERPL-SCNC: 4.9 MMOL/L — SIGNIFICANT CHANGE UP (ref 3.5–5.3)
POTASSIUM SERPL-SCNC: 4.9 MMOL/L — SIGNIFICANT CHANGE UP (ref 3.5–5.3)
PROT SERPL-MCNC: 7.6 G/DL — SIGNIFICANT CHANGE UP (ref 6.4–8.2)
PROT SERPL-MCNC: 7.6 G/DL — SIGNIFICANT CHANGE UP (ref 6.4–8.2)
PROT UR-MCNC: NEGATIVE MG/DL — SIGNIFICANT CHANGE UP
PROT UR-MCNC: NEGATIVE MG/DL — SIGNIFICANT CHANGE UP
PROTHROM AB SERPL-ACNC: 10.8 SEC — SIGNIFICANT CHANGE UP (ref 9.5–13)
PROTHROM AB SERPL-ACNC: 10.8 SEC — SIGNIFICANT CHANGE UP (ref 9.5–13)
RBC # BLD: 4.59 M/UL — SIGNIFICANT CHANGE UP (ref 3.8–5.2)
RBC # BLD: 4.59 M/UL — SIGNIFICANT CHANGE UP (ref 3.8–5.2)
RBC # FLD: 13.6 % — SIGNIFICANT CHANGE UP (ref 10.3–14.5)
RBC # FLD: 13.6 % — SIGNIFICANT CHANGE UP (ref 10.3–14.5)
RBC CASTS # UR COMP ASSIST: 0 /HPF — SIGNIFICANT CHANGE UP (ref 0–4)
RBC CASTS # UR COMP ASSIST: 0 /HPF — SIGNIFICANT CHANGE UP (ref 0–4)
RSV RNA NPH QL NAA+NON-PROBE: SIGNIFICANT CHANGE UP
RSV RNA NPH QL NAA+NON-PROBE: SIGNIFICANT CHANGE UP
SARS-COV-2 RNA SPEC QL NAA+PROBE: SIGNIFICANT CHANGE UP
SARS-COV-2 RNA SPEC QL NAA+PROBE: SIGNIFICANT CHANGE UP
SODIUM SERPL-SCNC: 136 MMOL/L — SIGNIFICANT CHANGE UP (ref 132–145)
SODIUM SERPL-SCNC: 136 MMOL/L — SIGNIFICANT CHANGE UP (ref 132–145)
SP GR SPEC: 1.03 — HIGH (ref 1–1.03)
SP GR SPEC: 1.03 — HIGH (ref 1–1.03)
TROPONIN I, HIGH SENSITIVITY RESULT: 4.9 NG/L — SIGNIFICANT CHANGE UP
TROPONIN I, HIGH SENSITIVITY RESULT: 4.9 NG/L — SIGNIFICANT CHANGE UP
UROBILINOGEN FLD QL: 0.2 MG/DL — SIGNIFICANT CHANGE UP (ref 0.2–1)
UROBILINOGEN FLD QL: 0.2 MG/DL — SIGNIFICANT CHANGE UP (ref 0.2–1)
WBC # BLD: 6.31 K/UL — SIGNIFICANT CHANGE UP (ref 3.8–10.5)
WBC # BLD: 6.31 K/UL — SIGNIFICANT CHANGE UP (ref 3.8–10.5)
WBC # FLD AUTO: 6.31 K/UL — SIGNIFICANT CHANGE UP (ref 3.8–10.5)
WBC # FLD AUTO: 6.31 K/UL — SIGNIFICANT CHANGE UP (ref 3.8–10.5)
WBC UR QL: 0 /HPF — SIGNIFICANT CHANGE UP (ref 0–5)
WBC UR QL: 0 /HPF — SIGNIFICANT CHANGE UP (ref 0–5)

## 2023-12-06 PROCEDURE — 0042T: CPT

## 2023-12-06 PROCEDURE — 71045 X-RAY EXAM CHEST 1 VIEW: CPT | Mod: 26

## 2023-12-06 PROCEDURE — 99291 CRITICAL CARE FIRST HOUR: CPT

## 2023-12-06 PROCEDURE — 70496 CT ANGIOGRAPHY HEAD: CPT | Mod: 26,MH

## 2023-12-06 PROCEDURE — 70450 CT HEAD/BRAIN W/O DYE: CPT | Mod: 26,MH,59

## 2023-12-06 PROCEDURE — 70498 CT ANGIOGRAPHY NECK: CPT | Mod: 26,MH

## 2023-12-06 NOTE — ED PROVIDER NOTE - CLINICAL SUMMARY MEDICAL DECISION MAKING FREE TEXT BOX
79 y/o Female with PMHx of long covid presenting with dizziness and gait disturbance since last night. On exam patient was found to have a broad based gait with some leaning to the left. Plan to obtain imaging, labs, EKG, and reassess. 77 y/o Female with PMHx of long covid presenting with dizziness and gait disturbance since last night. On exam patient was found to have a broad based gait with some leaning to the left. Plan to obtain imaging, labs, EKG, and reassess.    840 pm case d/w Dr Carlisle telestroke, Dr Nelson neuro, Dr Mckeon neuroradiology, admit to [facility]

## 2023-12-06 NOTE — ED PROVIDER NOTE - OBJECTIVE STATEMENT
79 y/o Female with PMHx of long covid(follows with internist and neurologist at [place]) presents with dizziness and gait disturbance since last night. Patient states that she has been feeling flu like symptoms for the past couple of days. Denies fever/chills. 77 y/o Female with PMHx of long covid(follows with internist and neurologist at [place]) presents with dizziness and gait disturbance since last night. Patient states that she has been feeling flu like symptoms for the past couple of days. Denies fever/chills.

## 2023-12-06 NOTE — ED PROVIDER NOTE - NSICDXPASTSURGICALHX_GEN_ALL_CORE_FT
What Is The Reason For Today's Visit?: Excessive sun exposure
PAST SURGICAL HISTORY:  H/O foot surgery     Status post appendectomy

## 2023-12-06 NOTE — ED ADULT NURSE NOTE - NSFALLUNIVINTERV_ED_ALL_ED
Bed/Stretcher in lowest position, wheels locked, appropriate side rails in place/Call bell, personal items and telephone in reach/Instruct patient to call for assistance before getting out of bed/chair/stretcher/Non-slip footwear applied when patient is off stretcher/[place] to call system/Physically safe environment - no spills, clutter or unnecessary equipment/Purposeful proactive rounding/Room/bathroom lighting operational, light cord in reach Bed/Stretcher in lowest position, wheels locked, appropriate side rails in place/Call bell, personal items and telephone in reach/Instruct patient to call for assistance before getting out of bed/chair/stretcher/Non-slip footwear applied when patient is off stretcher/[place] to call system/Physically safe environment - no spills, clutter or unnecessary equipment/Purposeful proactive rounding/Room/bathroom lighting operational, light cord in reach

## 2023-12-06 NOTE — ED ADULT NURSE REASSESSMENT NOTE - NS ED NURSE REASSESS COMMENT FT1
Received pt aox4, resting comfortably in stretcher in no pain or dikstgress. Family at bedside. Admitted to [facility], awaiting bed and transport. safety and [id number] measures maintained. Received pt aox4, resting comfortably in stretcher in no pain or dikstgress. Family at bedside. Admitted to [facility], awaiting bed and transport. safety and [id number] measures maintained.

## 2023-12-06 NOTE — ED PROVIDER NOTE - CRITICAL CARE ATTENDING CONTRIBUTION TO CARE
large ventricles on CT, CTA neg, perfusion neg, gait disturbance intermittently worsening x weeks, recent outpatient MRI October microvasc changes no CVA, case d/w telestroke doctor, neuro at [place] and neuroradiology.  Code stroke was activated in Triage however patients sx waxed this morning and pt not candidate for TPA.  Admit to neuro at [place] large ventricles on CT, CTA neg, perfusion neg, gait disturbance intermittently worsening x weeks, recent outpatient MRI October microvasc changes no CVA, case d/w telestroke doctor, neuro at [place] and neuroradiology.  Code stroke was activated in Triage however patients sx waxed this morning and pt not candidate for TPA.  Admit to neuro at [place]

## 2023-12-06 NOTE — ED PROVIDER NOTE - CHPI ED SYMPTOMS NEG
no fever/no chills
-thought to have UTI on last admission, cultures were negative, his current U/A has no bacteria, leuk esterase or nitrites, and he has no urinary complaints although he did have prior significant imaging findings consistent with urinary tract infection, so the source of the fever remains unclear   -obtain urine/blood cultures   -repeat CT scan abdomen/pelvis  -met SIRS criteria on arrival (WBC, tachycardia)  -obtain HIV test (patient gave verbal consent)  -obtain fungal blood cultures  -empirically start Zosyn  -obtain ESR, CRP, CARLOS

## 2023-12-07 ENCOUNTER — TRANSCRIPTION ENCOUNTER (OUTPATIENT)
Age: 78
End: 2023-12-07

## 2023-12-07 VITALS
HEART RATE: 61 BPM | RESPIRATION RATE: 18 BRPM | SYSTOLIC BLOOD PRESSURE: 142 MMHG | DIASTOLIC BLOOD PRESSURE: 81 MMHG | TEMPERATURE: 98 F | OXYGEN SATURATION: 100 %

## 2023-12-07 DIAGNOSIS — R26.81 UNSTEADINESS ON FEET: ICD-10-CM

## 2023-12-07 DIAGNOSIS — I10 ESSENTIAL (PRIMARY) HYPERTENSION: ICD-10-CM

## 2023-12-07 DIAGNOSIS — I67.1 CEREBRAL ANEURYSM, NONRUPTURED: ICD-10-CM

## 2023-12-07 DIAGNOSIS — U09.9 POST COVID-19 CONDITION, UNSPECIFIED: ICD-10-CM

## 2023-12-07 DIAGNOSIS — G91.2 (IDIOPATHIC) NORMAL PRESSURE HYDROCEPHALUS: ICD-10-CM

## 2023-12-07 DIAGNOSIS — Z29.9 ENCOUNTER FOR PROPHYLACTIC MEASURES, UNSPECIFIED: ICD-10-CM

## 2023-12-07 DIAGNOSIS — E78.5 HYPERLIPIDEMIA, UNSPECIFIED: ICD-10-CM

## 2023-12-07 LAB
A1C WITH ESTIMATED AVERAGE GLUCOSE RESULT: 5.1 % — SIGNIFICANT CHANGE UP (ref 4–5.6)
A1C WITH ESTIMATED AVERAGE GLUCOSE RESULT: 5.1 % — SIGNIFICANT CHANGE UP (ref 4–5.6)
ANION GAP SERPL CALC-SCNC: 11 MMOL/L — SIGNIFICANT CHANGE UP (ref 5–17)
ANION GAP SERPL CALC-SCNC: 11 MMOL/L — SIGNIFICANT CHANGE UP (ref 5–17)
BUN SERPL-MCNC: 10 MG/DL — SIGNIFICANT CHANGE UP (ref 7–23)
BUN SERPL-MCNC: 10 MG/DL — SIGNIFICANT CHANGE UP (ref 7–23)
CALCIUM SERPL-MCNC: 8.7 MG/DL — SIGNIFICANT CHANGE UP (ref 8.4–10.5)
CALCIUM SERPL-MCNC: 8.7 MG/DL — SIGNIFICANT CHANGE UP (ref 8.4–10.5)
CHLORIDE SERPL-SCNC: 101 MMOL/L — SIGNIFICANT CHANGE UP (ref 96–108)
CHLORIDE SERPL-SCNC: 101 MMOL/L — SIGNIFICANT CHANGE UP (ref 96–108)
CHOLEST SERPL-MCNC: 155 MG/DL — SIGNIFICANT CHANGE UP
CHOLEST SERPL-MCNC: 155 MG/DL — SIGNIFICANT CHANGE UP
CO2 SERPL-SCNC: 26 MMOL/L — SIGNIFICANT CHANGE UP (ref 22–31)
CO2 SERPL-SCNC: 26 MMOL/L — SIGNIFICANT CHANGE UP (ref 22–31)
CREAT SERPL-MCNC: 0.84 MG/DL — SIGNIFICANT CHANGE UP (ref 0.5–1.3)
CREAT SERPL-MCNC: 0.84 MG/DL — SIGNIFICANT CHANGE UP (ref 0.5–1.3)
EGFR: 70 ML/MIN/1.73M2 — SIGNIFICANT CHANGE UP
EGFR: 70 ML/MIN/1.73M2 — SIGNIFICANT CHANGE UP
ESTIMATED AVERAGE GLUCOSE: 100 MG/DL — SIGNIFICANT CHANGE UP (ref 68–114)
ESTIMATED AVERAGE GLUCOSE: 100 MG/DL — SIGNIFICANT CHANGE UP (ref 68–114)
GLUCOSE SERPL-MCNC: 96 MG/DL — SIGNIFICANT CHANGE UP (ref 70–99)
GLUCOSE SERPL-MCNC: 96 MG/DL — SIGNIFICANT CHANGE UP (ref 70–99)
HCT VFR BLD CALC: 42 % — SIGNIFICANT CHANGE UP (ref 34.5–45)
HCT VFR BLD CALC: 42 % — SIGNIFICANT CHANGE UP (ref 34.5–45)
HDLC SERPL-MCNC: 63 MG/DL — SIGNIFICANT CHANGE UP
HDLC SERPL-MCNC: 63 MG/DL — SIGNIFICANT CHANGE UP
HGB BLD-MCNC: 13.6 G/DL — SIGNIFICANT CHANGE UP (ref 11.5–15.5)
HGB BLD-MCNC: 13.6 G/DL — SIGNIFICANT CHANGE UP (ref 11.5–15.5)
LIPID PNL WITH DIRECT LDL SERPL: 78 MG/DL — SIGNIFICANT CHANGE UP
LIPID PNL WITH DIRECT LDL SERPL: 78 MG/DL — SIGNIFICANT CHANGE UP
MAGNESIUM SERPL-MCNC: 2.1 MG/DL — SIGNIFICANT CHANGE UP (ref 1.6–2.6)
MAGNESIUM SERPL-MCNC: 2.1 MG/DL — SIGNIFICANT CHANGE UP (ref 1.6–2.6)
MCHC RBC-ENTMCNC: 29.3 PG — SIGNIFICANT CHANGE UP (ref 27–34)
MCHC RBC-ENTMCNC: 29.3 PG — SIGNIFICANT CHANGE UP (ref 27–34)
MCHC RBC-ENTMCNC: 32.4 GM/DL — SIGNIFICANT CHANGE UP (ref 32–36)
MCHC RBC-ENTMCNC: 32.4 GM/DL — SIGNIFICANT CHANGE UP (ref 32–36)
MCV RBC AUTO: 90.5 FL — SIGNIFICANT CHANGE UP (ref 80–100)
MCV RBC AUTO: 90.5 FL — SIGNIFICANT CHANGE UP (ref 80–100)
NON HDL CHOLESTEROL: 92 MG/DL — SIGNIFICANT CHANGE UP
NON HDL CHOLESTEROL: 92 MG/DL — SIGNIFICANT CHANGE UP
NRBC # BLD: 0 /100 WBCS — SIGNIFICANT CHANGE UP (ref 0–0)
NRBC # BLD: 0 /100 WBCS — SIGNIFICANT CHANGE UP (ref 0–0)
PHOSPHATE SERPL-MCNC: 3.3 MG/DL — SIGNIFICANT CHANGE UP (ref 2.5–4.5)
PHOSPHATE SERPL-MCNC: 3.3 MG/DL — SIGNIFICANT CHANGE UP (ref 2.5–4.5)
PLATELET # BLD AUTO: 169 K/UL — SIGNIFICANT CHANGE UP (ref 150–400)
PLATELET # BLD AUTO: 169 K/UL — SIGNIFICANT CHANGE UP (ref 150–400)
POTASSIUM SERPL-MCNC: 4.2 MMOL/L — SIGNIFICANT CHANGE UP (ref 3.5–5.3)
POTASSIUM SERPL-MCNC: 4.2 MMOL/L — SIGNIFICANT CHANGE UP (ref 3.5–5.3)
POTASSIUM SERPL-SCNC: 4.2 MMOL/L — SIGNIFICANT CHANGE UP (ref 3.5–5.3)
POTASSIUM SERPL-SCNC: 4.2 MMOL/L — SIGNIFICANT CHANGE UP (ref 3.5–5.3)
RBC # BLD: 4.64 M/UL — SIGNIFICANT CHANGE UP (ref 3.8–5.2)
RBC # BLD: 4.64 M/UL — SIGNIFICANT CHANGE UP (ref 3.8–5.2)
RBC # FLD: 13.7 % — SIGNIFICANT CHANGE UP (ref 10.3–14.5)
RBC # FLD: 13.7 % — SIGNIFICANT CHANGE UP (ref 10.3–14.5)
SODIUM SERPL-SCNC: 138 MMOL/L — SIGNIFICANT CHANGE UP (ref 135–145)
SODIUM SERPL-SCNC: 138 MMOL/L — SIGNIFICANT CHANGE UP (ref 135–145)
TRIGL SERPL-MCNC: 69 MG/DL — SIGNIFICANT CHANGE UP
TRIGL SERPL-MCNC: 69 MG/DL — SIGNIFICANT CHANGE UP
TSH SERPL-MCNC: 4.01 UIU/ML — SIGNIFICANT CHANGE UP (ref 0.27–4.2)
TSH SERPL-MCNC: 4.01 UIU/ML — SIGNIFICANT CHANGE UP (ref 0.27–4.2)
WBC # BLD: 4.92 K/UL — SIGNIFICANT CHANGE UP (ref 3.8–10.5)
WBC # BLD: 4.92 K/UL — SIGNIFICANT CHANGE UP (ref 3.8–10.5)
WBC # FLD AUTO: 4.92 K/UL — SIGNIFICANT CHANGE UP (ref 3.8–10.5)
WBC # FLD AUTO: 4.92 K/UL — SIGNIFICANT CHANGE UP (ref 3.8–10.5)

## 2023-12-07 PROCEDURE — 80048 BASIC METABOLIC PNL TOTAL CA: CPT

## 2023-12-07 PROCEDURE — 93306 TTE W/DOPPLER COMPLETE: CPT

## 2023-12-07 PROCEDURE — 99223 1ST HOSP IP/OBS HIGH 75: CPT

## 2023-12-07 PROCEDURE — 84100 ASSAY OF PHOSPHORUS: CPT

## 2023-12-07 PROCEDURE — 83735 ASSAY OF MAGNESIUM: CPT

## 2023-12-07 PROCEDURE — 70450 CT HEAD/BRAIN W/O DYE: CPT

## 2023-12-07 PROCEDURE — 86703 HIV-1/HIV-2 1 RESULT ANTBDY: CPT

## 2023-12-07 PROCEDURE — 99291 CRITICAL CARE FIRST HOUR: CPT

## 2023-12-07 PROCEDURE — 70551 MRI BRAIN STEM W/O DYE: CPT

## 2023-12-07 PROCEDURE — 85610 PROTHROMBIN TIME: CPT

## 2023-12-07 PROCEDURE — 84484 ASSAY OF TROPONIN QUANT: CPT

## 2023-12-07 PROCEDURE — 97161 PT EVAL LOW COMPLEX 20 MIN: CPT

## 2023-12-07 PROCEDURE — 85730 THROMBOPLASTIN TIME PARTIAL: CPT

## 2023-12-07 PROCEDURE — 87637 SARSCOV2&INF A&B&RSV AMP PRB: CPT

## 2023-12-07 PROCEDURE — 85025 COMPLETE CBC W/AUTO DIFF WBC: CPT

## 2023-12-07 PROCEDURE — 0042T: CPT

## 2023-12-07 PROCEDURE — 97165 OT EVAL LOW COMPLEX 30 MIN: CPT

## 2023-12-07 PROCEDURE — 99222 1ST HOSP IP/OBS MODERATE 55: CPT

## 2023-12-07 PROCEDURE — 93306 TTE W/DOPPLER COMPLETE: CPT | Mod: 26

## 2023-12-07 PROCEDURE — 82962 GLUCOSE BLOOD TEST: CPT

## 2023-12-07 PROCEDURE — 80053 COMPREHEN METABOLIC PANEL: CPT

## 2023-12-07 PROCEDURE — 84443 ASSAY THYROID STIM HORMONE: CPT

## 2023-12-07 PROCEDURE — 81001 URINALYSIS AUTO W/SCOPE: CPT

## 2023-12-07 PROCEDURE — 80061 LIPID PANEL: CPT

## 2023-12-07 PROCEDURE — 83036 HEMOGLOBIN GLYCOSYLATED A1C: CPT

## 2023-12-07 PROCEDURE — 70498 CT ANGIOGRAPHY NECK: CPT

## 2023-12-07 PROCEDURE — 70496 CT ANGIOGRAPHY HEAD: CPT

## 2023-12-07 PROCEDURE — 36415 COLL VENOUS BLD VENIPUNCTURE: CPT

## 2023-12-07 PROCEDURE — 85027 COMPLETE CBC AUTOMATED: CPT

## 2023-12-07 PROCEDURE — 71045 X-RAY EXAM CHEST 1 VIEW: CPT

## 2023-12-07 PROCEDURE — 70551 MRI BRAIN STEM W/O DYE: CPT | Mod: 26

## 2023-12-07 PROCEDURE — 83605 ASSAY OF LACTIC ACID: CPT

## 2023-12-07 RX ORDER — ASPIRIN/CALCIUM CARB/MAGNESIUM 324 MG
81 TABLET ORAL DAILY
Refills: 0 | Status: DISCONTINUED | OUTPATIENT
Start: 2023-12-07 | End: 2023-12-07

## 2023-12-07 RX ORDER — ATORVASTATIN CALCIUM 80 MG/1
20 TABLET, FILM COATED ORAL AT BEDTIME
Refills: 0 | Status: DISCONTINUED | OUTPATIENT
Start: 2023-12-07 | End: 2023-12-07

## 2023-12-07 RX ORDER — ASPIRIN/CALCIUM CARB/MAGNESIUM 324 MG
1 TABLET ORAL
Qty: 0 | Refills: 0 | DISCHARGE
Start: 2023-12-07

## 2023-12-07 RX ORDER — ASPIRIN/CALCIUM CARB/MAGNESIUM 324 MG
1 TABLET ORAL
Qty: 30 | Refills: 1
Start: 2023-12-07 | End: 2024-02-04

## 2023-12-07 RX ORDER — ATORVASTATIN CALCIUM 80 MG/1
1 TABLET, FILM COATED ORAL
Qty: 0 | Refills: 0 | DISCHARGE
Start: 2023-12-07

## 2023-12-07 RX ORDER — ENOXAPARIN SODIUM 100 MG/ML
40 INJECTION SUBCUTANEOUS EVERY 24 HOURS
Refills: 0 | Status: DISCONTINUED | OUTPATIENT
Start: 2023-12-07 | End: 2023-12-07

## 2023-12-07 RX ORDER — INFLUENZA VIRUS VACCINE 15; 15; 15; 15 UG/.5ML; UG/.5ML; UG/.5ML; UG/.5ML
0.7 SUSPENSION INTRAMUSCULAR ONCE
Refills: 0 | Status: DISCONTINUED | OUTPATIENT
Start: 2023-12-07 | End: 2023-12-07

## 2023-12-07 RX ADMIN — Medication 1 TABLET(S): at 06:51

## 2023-12-07 RX ADMIN — ENOXAPARIN SODIUM 40 MILLIGRAM(S): 100 INJECTION SUBCUTANEOUS at 06:51

## 2023-12-07 RX ADMIN — Medication 81 MILLIGRAM(S): at 11:27

## 2023-12-07 NOTE — PHYSICAL THERAPY INITIAL EVALUATION ADULT - GENERAL OBSERVATIONS, REHAB EVAL
Pt received semifowler in [facility]. Pt received semifowler in [facility].

## 2023-12-07 NOTE — PHYSICAL THERAPY INITIAL EVALUATION ADULT - PERTINENT HX OF CURRENT PROBLEM, REHAB EVAL
78y Female with PMHx of long COVID following 4th Moderna vaccine, who initially presented to [facility] with worsening dizziness and unsteady gait. Patient reports for the past 1 month she has been experiencing increased dizziness and feeling unsteady while walking. She underwent a brain MRI 1 month ago which was "normal" per patient. She was walking yesterday evening in the [facility] when she became very dizzy and felt as though "she was taking up the whole sidewalk while walking". She denies a room spinning sensation. She also endorses occasional ?binocular diplopia, that she is able to "get rid of her own". On evaluation at [facility], she states the doctor said she was leaning to the left and had a wide based gait. Imaging performed at [facility] negative except for a 1.7cm L supraclinoid ICA seen on CTA. Of note, patient was diagnosed with a UTI yesterday and is currently receiving Bactrim and also reports she has been gradually increasing her naltrexone dose. She denies any headaches or nausea. 78y Female with PMHx of long COVID following 4th Moderna vaccine, who initially presented to [facility] with worsening dizziness and unsteady gait. Patient reports for the past 1 month she has been experiencing increased dizziness and feeling unsteady while walking. She underwent a brain MRI 1 month ago which was "normal" per patient. She was walking yesterday evening in the [facility] when she became very dizzy and felt as though "she was taking up the whole sidewalk while walking". She denies a room spinning sensation. She also endorses occasional ?binocular diplopia, that she is able to "get rid of her own". On evaluation at [facility], she states the doctor said she was leaning to the left and had a wide based gait. Imaging performed at [facility] negative except for a 1.7cm L supraclinoid ICA seen on CTA. Of note, patient was diagnosed with a UTI yesterday and is currently receiving Bactrim and also reports she has been gradually increasing her naltrexone dose. She denies any headaches or nausea.

## 2023-12-07 NOTE — DISCHARGE NOTE NURSING/CASE MANAGEMENT/SOCIAL WORK - NSDCPEFALRISK_GEN_ALL_CORE
For information on Fall & Injury Prevention, visit: [email or web address] OR  [email or web address] OR  https://www.cdc.gov/steadi/patient.html For information on Fall & Injury Prevention, visit: [email or web address] OR  [email or web address] OR  https://www.cdc.gov/steadi/patient.html

## 2023-12-07 NOTE — OCCUPATIONAL THERAPY INITIAL EVALUATION ADULT - MODALITIES TREATMENT COMMENTS
Cranial Nerves II - XII: II: PERRLA; visual fields are full to confrontation III, IV, VI: EOMI, no nystagmus appreciated V: facial sensation intact to light touch V1-V3 b/l VII: no ptosis, no facial droop, symmetric eyebrow raise and closure VIII: hearing intact to finger rub b/l  XI: head turning and shoulder shrug intact b/l XII: tongue protrusion midline // Pt able to ambulate in room/hallway and ascend/descend 1 FOS independently and w/o AD/AE.

## 2023-12-07 NOTE — DISCHARGE NOTE NURSING/CASE MANAGEMENT/SOCIAL WORK - PATIENT PORTAL LINK FT
You can access the FollowMyHealth Patient Portal offered by [facility] by registering at the following website: [email or web address]. By joining [employer]’s FollowMyHealth portal, you will also be able to view your health information using other applications (apps) compatible with our system. You can access the FollowMyHealth Patient Portal offered by [facility] by registering at the following website: [email or web address]. By joining [employer]’s FollowMyHealth portal, you will also be able to view your health information using other applications (apps) compatible with our system.

## 2023-12-07 NOTE — DISCHARGE NOTE PROVIDER - PROVIDER TOKENS
PROVIDER:[TOKEN:[[id number]],SCHEDULEDAPPT:[12/22/2023],SCHEDULEDAPPTTIME:[12:00 PM]] PROVIDER:[TOKEN:[[id number]],SCHEDULEDAPPT:[12/22/2023],SCHEDULEDAPPTTIME:[12:00 PM]]

## 2023-12-07 NOTE — DISCHARGE NOTE PROVIDER - HOSPITAL COURSE
Hospital course:  78y Female with PMH of long COVID following 4th Moderna vaccine, who initially presented to [facility] with worsening dizziness and unsteady gait. Patient reports for the past 1 month she has been experiencing increased dizziness and feeling unsteady while walking. She underwent a brain MRI 1 month ago which was "normal" per patient. She was walking yesterday evening in the [facility] when she became very dizzy and felt as though "she was taking up the whole sidewalk while walking". She denies a room spinning sensation. She also endorses occasional ?binocular diplopia, that she is able to "get rid of her own". On evaluation at [facility], she states the doctor said she was leaning to the left and had a wide based gait. Imaging performed at [facility] negative except for a 1.7cm L supraclinoid ICA seen on CTA. Of note, felt as though she had a UTI yesterday and started taking an old prescription for macrobid and also reports she has been gradually increasing her naltrexone dose. She denies any headaches or nausea. MRI completed and negative for any acute infarct. Echo completed and shows normal EF and mild to moderate aortic regurg. Given incidental finding of L ICA aneurysm neurosurgery was consulted and patient has been instructed to follow up outpatient. She will also follow up with her own neurologist given CTH findings showing ventriculomegaly and possible NPH as the patient has been experiencing some urinary urgency and mild neurologic symptoms.       Discharge NIHSS:     During this hospital course, patient did not have a stroke.     Patient had the following workup done in house:  CT Head: No intracranial hemorrhage or acute transcortical infarct.   Findings suggestive of NPH and clinical correlation is recommended.  MR Head Non Contrast: No evidence of infarction. No adverse interval change since 10/14/2023  CT Angio Head: No large vessel occlusion. 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  CT Angio Neck: Negative  [X]echo:   1. Normal left ventricular size and systolic function.   2. Normal right ventricular size and systolic function.   3. Normal atria.   4. Aortic sclerosis without significant stenosis.   5. Mild-to-moderate aortic regurgitation.   6. No evidence of pulmonary hypertension.   7. No pericardial effusion.   8. No prior echo is available for comparison.    [X]labs:  LDL: 78  A1C: 5.1  TSH: 4.01  []other    Physical exam at discharge:  Physical exam:  General: No acute distress, awake and alert  Cardiovascular: Regular rate and rhythm  Pulmonary:  No use of accessory muscles  GI: Abdomen soft, non-tender, non-distended    Neurologic:  -Mental status: Awake, alert, oriented to person, place, and time. Speech is fluent with intact naming, repetition, and comprehension, no dysarthria. Recent and remote memory intact. Follows commands. Attention/concentration intact. Fund of knowledge appropriate.  -Cranial nerves:   II: Visual fields are full to confrontation.  III, IV, VI: Extraocular movements are intact without nystagmus. Pupils equally round and reactive to light  V:  Facial sensation V1-V3 equal and intact   VII: Face is symmetric with normal eye closure and smile  Motor: Normal bulk and tone. No pronator drift. Strength bilateral upper extremity 5/5, bilateral lower extremities 5/5.  Rapid alternating movements intact and symmetric  Sensation: Intact to light touch bilaterally. No neglect or extinction on double simultaneous testing.  Coordination: finger to nose intact bilaterally  Gait: Narrow and steady    New medications on discharge: ASA 81mg  Labs to be followed up:  Imaging to be done as outpatient:  Further outpatient workup: F/u w/ NSGY regarding ICA aneurysm, follow up with own neurologist for possible NPH workup   Hospital course:  78y Female with PMH of long COVID following 4th Moderna vaccine, who initially presented to [facility] with worsening dizziness and unsteady gait. Patient reports for the past 1 month she has been experiencing increased dizziness and feeling unsteady while walking. She underwent a brain MRI 1 month ago which was "normal" per patient. She was walking yesterday evening in the [facility] when she became very dizzy and felt as though "she was taking up the whole sidewalk while walking". She denies a room spinning sensation. She also endorses occasional ?binocular diplopia, that she is able to "get rid of her own". On evaluation at [facility], she states the doctor said she was leaning to the left and had a wide based gait. Imaging performed at [facility] negative except for a 1.7cm L supraclinoid ICA seen on CTA. Of note, felt as though she had a UTI yesterday and started taking an old prescription for macrobid and also reports she has been gradually increasing her naltrexone dose. She denies any headaches or nausea. MRI completed and negative for any acute infarct. Echo completed and shows normal EF and mild to moderate aortic regurg. Given incidental finding of L ICA aneurysm neurosurgery was consulted and patient has been instructed to follow up outpatient. She will also follow up with her own neurologist given CTH findings showing ventriculomegaly and possible NPH as the patient has been experiencing some urinary urgency and mild neurologic symptoms.       Discharge NIHSS:     During this hospital course, patient did not have a stroke.     Patient had the following workup done in house:  CT Head: No intracranial hemorrhage or acute transcortical infarct.   Findings suggestive of NPH and clinical correlation is recommended.  MR Head Non Contrast: No evidence of infarction. No adverse interval change since 10/14/2023  CT Angio Head: No large vessel occlusion. 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  CT Angio Neck: Negative  [X]echo:   1. Normal left ventricular size and systolic function.   2. Normal right ventricular size and systolic function.   3. Normal atria.   4. Aortic sclerosis without significant stenosis.   5. Mild-to-moderate aortic regurgitation.   6. No evidence of pulmonary hypertension.   7. No pericardial effusion.   8. No prior echo is available for comparison.    [X]labs:  LDL: 78  A1C: 5.1  TSH: 4.01  []other    Physical exam at discharge:  Physical exam:  General: No acute distress, awake and alert  Cardiovascular: Regular rate and rhythm  Pulmonary:  No use of accessory muscles  GI: Abdomen soft, non-tender, non-distended    Neurologic:  -Mental status: Awake, alert, oriented to person, place, and time. Speech is fluent with intact naming, repetition, and comprehension, no dysarthria. Recent and remote memory intact. Follows commands. Attention/concentration intact. Fund of knowledge appropriate.  -Cranial nerves:   II: Visual fields are full to confrontation.  III, IV, VI: Extraocular movements are intact without nystagmus. Pupils equally round and reactive to light  V:  Facial sensation V1-V3 equal and intact   VII: Face is symmetric with normal eye closure and smile  Motor: Normal bulk and tone. No pronator drift. Strength bilateral upper extremity 5/5, bilateral lower extremities 5/5.  Rapid alternating movements intact and symmetric  Sensation: Intact to light touch bilaterally. No neglect or extinction on double simultaneous testing.  Coordination: finger to nose intact bilaterally  Gait: Narrow and steady    New medications on discharge: ASA 81mg  Labs to be followed up:  Imaging to be done as outpatient:  Further outpatient workup: F/u w/ NSGY regarding ICA aneurysm, follow up with own neurologist for possible NPH workup

## 2023-12-07 NOTE — DISCHARGE NOTE PROVIDER - CARE PROVIDER_API CALL
Reji Tay.  Neurosurgery  [address] [place] [address]  Phone: [phone]  Fax: [phone]  Scheduled Appointment: 12/22/2023 12:00 PM   Reji Tay.  Neurosurgery  [address] [place] [address]  Phone: [phone]  Fax: [phone]  Scheduled Appointment: 12/22/2023 12:00 PM

## 2023-12-07 NOTE — CONSULT NOTE ADULT - PROBLEM SELECTOR RECOMMENDATION 3
CTA reviewed, shows 1.7 mm left supraclinoid aneurysm; appreciate Neurosurgery recs; unlikely contributing to presenting sx, no need for further inpatient work-up or mgmt, but outpatient f/u appt. scheduled w/ Dr. Tay for 12/22/23 at 12:00pm

## 2023-12-07 NOTE — OCCUPATIONAL THERAPY INITIAL EVALUATION ADULT - ADDITIONAL COMMENTS
Prior to admission, pt performing all ADLs independently and w/o use of AD/AE. Pt reporting that she owns an SC, however does not use. Pt is L hand dominant and wears eye glasses.

## 2023-12-07 NOTE — PROGRESS NOTE ADULT - PROBLEM SELECTOR PLAN 1
#CVA vs. 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  - Stroke education

## 2023-12-07 NOTE — CONSULT NOTE ADULT - SUBJECTIVE AND OBJECTIVE BOX
Patient is a 78y old  Female who presents with a chief complaint of dizziness and unsteady gait (07 Dec 2023 16:52)    HPI:   **STROKE HPI***    HPI: 78y Female with PMHx of long COVID following 4th Moderna vaccine, who initially presented to [facility] with worsening dizziness and unsteady gait. Patient reports for the past 1 month she has been experiencing increased dizziness and feeling unsteady while walking. She underwent a brain MRI 1 month ago which was "normal" per patient. She was walking yesterday evening in the [facility] when she became very dizzy and felt as though "she was taking up the whole sidewalk while walking". She denies a room spinning sensation. She also endorses occasional ?binocular diplopia, that she is able to "get rid of her own". On evaluation at [facility], she states the doctor said she was leaning to the left and had a wide based gait. Imaging performed at [facility] negative except for a 1.7cm L supraclinoid ICA seen on CTA. Of note, patient was diagnosed with a UTI yesterday and is currently receiving Bactrim and also reports she has been gradually increasing her naltrexone dose. She denies any headaches or nausea.    (07 Dec 2023 04:47)    Review of Systems: 12 point review of systems otherwise negative    PAST MEDICAL & SURGICAL HISTORY:  Sleep apnea      UTI (urinary tract infection)      Pancreatitis      Status post appendectomy      H/O foot surgery    Social History:  nonsmoker    FAMILY HISTORY:  noncontributory       MEDICATIONS  (STANDING):  aspirin enteric coated 81 milliGRAM(s) Oral daily  atorvastatin 20 milliGRAM(s) Oral at bedtime  enoxaparin Injectable 40 milliGRAM(s) SubCutaneous every 24 hours  influenza  Vaccine (HIGH DOSE) 0.7 milliLiter(s) IntraMuscular once    MEDICATIONS  (PRN):      Allergies    No Known Allergies    Intolerances    Demerol HCl (Other (Mild))  Benadryl (Other (Mild))        Vital Signs Last 24 Hrs  T(C): 36.4 (07 Dec 2023 11:55), Max: 36.8 (07 Dec 2023 00:30)  T(F): 97.6 (07 Dec 2023 11:55), Max: 98.2 (07 Dec 2023 00:30)  HR: 61 (07 Dec 2023 11:55) (58 - 78)  BP: 142/81 (07 Dec 2023 11:55) (119/75 - 142/81)  BP(mean): --  RR: 18 (07 Dec 2023 11:55) (16 - 18)  SpO2: 100% (07 Dec 2023 11:55) (96% - 100%)    Parameters below as of 07 Dec 2023 11:55  Patient On (Oxygen Delivery Method): room air      CAPILLARY BLOOD GLUCOSE            Physical Exam:  (earlier today)  Daily     Daily   General:  well-appearing in NAD  HEENT:  MMM  CV:  RRR  Lungs:  CTA B/L  Abdomen:  soft NT ND  Extremities:  no edema B/L LE  Skin:  WWP  Neuro:  AAOx3, no dysarthria  gait exam deferred    LABS:                        13.6   4.92  )-----------( 169      ( 07 Dec 2023 05:30 )             42.0     12-07    138  |  101  |  10  ----------------------------<  96  4.2   |  26  |  0.84    Ca    8.7      07 Dec 2023 05:30  Phos  3.3     12-07  Mg     2.1     12-07    TPro  7.6  /  Alb  3.6  /  TBili  0.4  /  DBili  x   /  AST  30  /  ALT  20  /  AlkPhos  86  12-06    PT/INR - ( 06 Dec 2023 18:11 )   PT: 10.8 sec;   INR: 0.98          PTT - ( 06 Dec 2023 18:11 )  PTT:28.4 sec  Urinalysis Basic - ( 07 Dec 2023 05:30 )    Color: x / Appearance: x / SG: x / pH: x  Gluc: 96 mg/dL / Ketone: x  / Bili: x / Urobili: x   Blood: x / Protein: x / Nitrite: x   Leuk Esterase: x / RBC: x / WBC x   Sq Epi: x / Non Sq Epi: x / Bacteria: x         Patient is a 78y old  Female who presents with a chief complaint of dizziness and unsteady gait (07 Dec 2023 16:52)    HPI:   **STROKE HPI***    HPI: 78y Female with PMHx of long COVID following 4th Moderna vaccine, who initially presented to [facility] with worsening dizziness and unsteady gait. Patient reports for the past 1 month she has been experiencing increased dizziness and feeling unsteady while walking. She underwent a brain MRI 1 month ago which was "normal" per patient. She was walking yesterday evening in the [facility] when she became very dizzy and felt as though "she was taking up the whole sidewalk while walking". She denies a room spinning sensation. She also endorses occasional ?binocular diplopia, that she is able to "get rid of her own". On evaluation at [facility], she states the doctor said she was leaning to the left and had a wide based gait. Imaging performed at [facility] negative except for a 1.7cm L supraclinoid ICA seen on CTA. Of note, patient was diagnosed with a UTI yesterday and is currently receiving Bactrim and also reports she has been gradually increasing her naltrexone dose. She denies any headaches or nausea.    (07 Dec 2023 04:47)    Review of Systems: 12 point review of systems otherwise negative    PAST MEDICAL & SURGICAL HISTORY:  Sleep apnea      UTI (urinary tract infection)      Pancreatitis      Status post appendectomy      H/O foot surgery    Social History:  nonsmoker    FAMILY HISTORY:  noncontributory       MEDICATIONS  (STANDING):  aspirin enteric coated 81 milliGRAM(s) Oral daily  atorvastatin 20 milliGRAM(s) Oral at bedtime  enoxaparin Injectable 40 milliGRAM(s) SubCutaneous every 24 hours  influenza  Vaccine (HIGH DOSE) 0.7 milliLiter(s) IntraMuscular once    MEDICATIONS  (PRN):      Allergies    No Known Allergies    Intolerances    Demerol HCl (Other (Mild))  Benadryl (Other (Mild))        Vital Signs Last 24 Hrs  T(C): 36.4 (07 Dec 2023 11:55), Max: 36.8 (07 Dec 2023 00:30)  T(F): 97.6 (07 Dec 2023 11:55), Max: 98.2 (07 Dec 2023 00:30)  HR: 61 (07 Dec 2023 11:55) (58 - 78)  BP: 142/81 (07 Dec 2023 11:55) (119/75 - 142/81)  BP(mean): --  RR: 18 (07 Dec 2023 11:55) (16 - 18)  SpO2: 100% (07 Dec 2023 11:55) (96% - 100%)    Parameters below as of 07 Dec 2023 11:55  Patient On (Oxygen Delivery Method): room air      CAPILLARY BLOOD GLUCOSE            Physical Exam:  (earlier today)  Daily     Daily   General:  well-appearing in NAD  HEENT:  MMM  CV:  RRR  Lungs:  CTA B/L  Abdomen:  soft NT ND  Extremities:  no edema B/L LE  Skin:  WWP  Neuro:  AAOx3, no dysarthria  gait exam deferred    LABS:                        13.6   4.92  )-----------( 169      ( 07 Dec 2023 05:30 )             42.0     12-07    138  |  101  |  10  ----------------------------<  96  4.2   |  26  |  0.84    Ca    8.7      07 Dec 2023 05:30  Phos  3.3     12-07  Mg     2.1     12-07    TPro  7.6  /  Alb  3.6  /  TBili  0.4  /  DBili  x   /  AST  30  /  ALT  20  /  AlkPhos  86  12-06    PT/INR - ( 06 Dec 2023 18:11 )   PT: 10.8 sec;   INR: 0.98          PTT - ( 06 Dec 2023 18:11 )  PTT:28.4 sec  Urinalysis Basic - ( 07 Dec 2023 05:30 )    Color: x / Appearance: x / SG: x / pH: x  Gluc: 96 mg/dL / Ketone: x  / Bili: x / Urobili: x   Blood: x / Protein: x / Nitrite: x   Leuk Esterase: x / RBC: x / WBC x   Sq Epi: x / Non Sq Epi: x / Bacteria: x

## 2023-12-07 NOTE — CONSULT NOTE ADULT - ASSESSMENT
{\rtf1\hjfzmj76421\ansi\wodesws4106\ftnbj\uc1\deff0  {\fonttbl{\f0 \fnil Segoe UI;}{\f1 \fnil \fcharset0 Segoe UI;}{\f2 \fnil Times New Zaki;}}  {\colortbl ;\qqt066\yrbug892\rkzk644 ;\red0\green0\blue0 ;\red0\green0\llll318 ;\red0\green0\blue0 ;}  {\stylesheet{\f0\fs20 Normal;}{\cs1 Default Paragraph Font;}{\cs2\f0\fs16 Line Number;}{\cs3\f2\fs24\ul\cf3 Hyperlink;}}  {\*\revtbl{Unknown;}}  \rcryiq26234\vskyfm03695\ivjpc1045\nijyo9713\nylgw3922\mcekw7062\xtygdae924\yfatyis118\nogrowautofit\zauthc421\formshade\nofeaturethrottle1\dntblnsbdb\fet4\aendnotes\aftnnrlc\pgbrdrhead\pgbrdrfoot  \sectd\kfybgs49679\ktisnk06500\guttersxn0\sgczvnkz6693\spsuwune8335\kqytxdls0853\jlbsrmlf5170\kripnts988\bjengyq213\sbkpage\pgncont\pgndec  \plain\plain\f0\fs24\ql\plain\f0\fs24\plain\f0\fs20\mnhm8143\hich\f0\dbch\f0\loch\f0\fs20\par  Neurology\par  \par  78 y o Female with PMHx of long COVID following 4th Moderna vaccine, who initially presented to [facility] with worsening dizziness and unsteady gait. Patient reports for the past 1 month she has been experiencing increased dizziness and feeling unsteady while   walking. She underwent a brain MRI 1 month ago which was "normal" per patient. She was walking yesterday evening in the [facility] when she became very dizzy and felt as though "she was taking up the whole sidewalk while walking". She denies a room   spinning sensation. She also endorses occasional ?binocular diplopia, that she is able to "get rid of her own". On evaluation at [facility], she states the doctor said she was leaning to the left and had a wide based gait. Imaging performed at [facility] negative   except for a 1.7cm L supraclinoid ICA seen on CTA. Of note, patient was diagnosed with a UTI yesterday and is currently receiving Bactrim and also reports she has been gradually increasing her naltrexone dose. She denies any headaches or nausea. \par  \par  Neuro\par  #CVA vs. 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\par  - Stroke education\par  \par  Cards\par  #HTN\par  - permissive hypertension, Goal -180\par  - hold home blood pressure medication for now\par  - obtain TTE\par  \par  \par  #HLD\par  - initiated atorvastatin 20mg daily for now\par  - LDL results: pending\par  \par  Pulm\par  - call provider if SPO2 < 94%\par  \par  GI\par  #Nutrition/Fluids/Electrolytes \par  - replete K<4 and Mg <2\par  - Diet: DASH\par  \par  Renal\par  - daily BMP\par  \par  Infectious disease\par  #recent UTI\par  - UA with positive nitrites\par  \par  Endocrine\par  - A1C results: pending\par  \par  \par  - TSH results: pending\par  \par  DVT Prophylaxis\par  - lovenox sq for DVT prophylaxis \par  - SCDs for DVT prophylaxis \par  \par  \par  Dispo: pending PM&R ,  PT/OT eval\par   \par  Discussed daily hospital plans and goals with patient \par  \par  Discussed with Neurology Attending Dr. Nelson\par  \plain\f1\fs16\jiud1415\hich\f1\dbch\f1\loch\f1\cf2\fs16\par  \plain\f1\fs16\xcki3311\hich\f1\dbch\f1\loch\f1\cf2\fs16\strike\plain\f1\fs16\dput2853\hich\f1\dbch\f1\loch\f1\cf2\fs16\plain\f0\fs20\htod7213\hich\f0\dbch\f0\loch\f0\fs20\par  }   {\rtf1\htqwce63844\ansi\poxswmi1894\ftnbj\uc1\deff0  {\fonttbl{\f0 \fnil Segoe UI;}{\f1 \fnil \fcharset0 Segoe UI;}{\f2 \fnil Times New Zaki;}}  {\colortbl ;\zzf564\kktvl439\mefx069 ;\red0\green0\blue0 ;\red0\green0\owbb634 ;\red0\green0\blue0 ;}  {\stylesheet{\f0\fs20 Normal;}{\cs1 Default Paragraph Font;}{\cs2\f0\fs16 Line Number;}{\cs3\f2\fs24\ul\cf3 Hyperlink;}}  {\*\revtbl{Unknown;}}  \bcvmdi26341\sqdreo89860\orfps5786\vfihj6265\hitzv5583\kpicm1616\mitenbc186\ipwhxhj483\nogrowautofit\mouzhi715\formshade\nofeaturethrottle1\dntblnsbdb\fet4\aendnotes\aftnnrlc\pgbrdrhead\pgbrdrfoot  \sectd\pmozlk81816\djwsmm01655\guttersxn0\owuwjqkz9278\ouwoavrf7728\xythasoc6762\nrnqwljh9232\yvrkxsb809\vpspzjp727\sbkpage\pgncont\pgndec  \plain\plain\f0\fs24\ql\plain\f0\fs24\plain\f0\fs20\fklj8328\hich\f0\dbch\f0\loch\f0\fs20\par  Neurology\par  \par  78 y o Female with PMHx of long COVID following 4th Moderna vaccine, who initially presented to [place] with worsening dizziness and unsteady gait. Patient reports for the past 1 month she has been experiencing increased dizziness and feeling unsteady while   walking. She underwent a brain MRI 1 month ago which was "normal" per patient. She was walking yesterday evening in the [facility] when she became very dizzy and felt as though "she was taking up the whole sidewalk while walking". She denies a room   spinning sensation. She also endorses occasional ?binocular diplopia, that she is able to "get rid of her own". On evaluation at [place], she states the doctor said she was leaning to the left and had a wide based gait. Imaging performed at [place] negative   except for a 1.7cm L supraclinoid ICA seen on CTA. Of note, patient was diagnosed with a UTI yesterday and is currently receiving Bactrim and also reports she has been gradually increasing her naltrexone dose. She denies any headaches or nausea. \par  \par  Neuro\par  #CVA vs. 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\par  - Stroke education\par  \par  Cards\par  #HTN\par  - permissive hypertension, Goal -180\par  - hold home blood pressure medication for now\par  - obtain TTE\par  \par  \par  #HLD\par  - initiated atorvastatin 20mg daily for now\par  - LDL results: pending\par  \par  Pulm\par  - call provider if SPO2 < 94%\par  \par  GI\par  #Nutrition/Fluids/Electrolytes \par  - replete K<4 and Mg <2\par  - Diet: DASH\par  \par  Renal\par  - daily BMP\par  \par  Infectious disease\par  #recent UTI\par  - UA with positive nitrites\par  \par  Endocrine\par  - A1C results: pending\par  \par  \par  - TSH results: pending\par  \par  DVT Prophylaxis\par  - lovenox sq for DVT prophylaxis \par  - SCDs for DVT prophylaxis \par  \par  \par  Dispo: pending PM&R ,  PT/OT eval\par   \par  Discussed daily hospital plans and goals with patient \par  \par  Discussed with Neurology Attending Dr. Nelson\par  \plain\f1\fs16\xecq2868\hich\f1\dbch\f1\loch\f1\cf2\fs16\par  \plain\f1\fs16\ojjs8004\hich\f1\dbch\f1\loch\f1\cf2\fs16\strike\plain\f1\fs16\tgky3747\hich\f1\dbch\f1\loch\f1\cf2\fs16\plain\f0\fs20\gvjy5141\hich\f0\dbch\f0\loch\f0\fs20\par  }

## 2023-12-07 NOTE — PHYSICAL THERAPY INITIAL EVALUATION ADULT - MANUAL MUSCLE TESTING RESULTS, REHAB EVAL
Grossly 4/5 throughout all BUE and BLE pivotal movements except for 3/5 bilat knee flex noted. Good bilat  strength noted.

## 2023-12-07 NOTE — OCCUPATIONAL THERAPY INITIAL EVALUATION ADULT - PREDICTED DURATION OF THERAPY (DAYS/WKS), OT EVAL
OT to discharge from skilled program. Please re-consult OT should pt present with any significant changes in function/new deficits.

## 2023-12-07 NOTE — H&P ADULT - NSHPLABSRESULTS_GEN_ALL_CORE
Fingerstick Blood Glucose: CAPILLARY BLOOD GLUCOSE      POCT Blood Glucose.: 91 mg/dL (06 Dec 2023 18:02)    LABS:                        13.9   6.31  )-----------( 193      ( 06 Dec 2023 18:11 )             42.6     12-06    136  |  101  |  14  ----------------------------<  104<H>  4.9   |  30  |  0.91    Ca    9.2      06 Dec 2023 18:11    TPro  7.6  /  Alb  3.6  /  TBili  0.4  /  DBili  x   /  AST  30  /  ALT  20  /  AlkPhos  86  12-06    PT/INR - ( 06 Dec 2023 18:11 )   PT: 10.8 sec;   INR: 0.98          PTT - ( 06 Dec 2023 18:11 )  PTT:28.4 sec      Urinalysis Basic - ( 06 Dec 2023 18:11 )    Color: x / Appearance: x / SG: x / pH: x  Gluc: 104 mg/dL / Ketone: x  / Bili: x / Urobili: x   Blood: x / Protein: x / Nitrite: x   Leuk Esterase: x / RBC: x / WBC x   Sq Epi: x / Non Sq Epi: x / Bacteria: x        RADIOLOGY & ADDITIONAL STUDIES:    < from: CT Brain Stroke Protocol (12.06.23 @ 18:13) >    IMPRESSION: No intracranial hemorrhage or acute transcortical infarct.   Findings suggestive of NPH and clinical correlation is recommended.    < end of copied text >    < from: CT Angio Brain Stroke Protocol  w/ IV Cont (12.06.23 @ 18:40) >    IMPRESSION: No large vessel occlusion. 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    < end of copied text >    < from: CT Angio Brain Stroke Protocol  w/ IV Cont (12.06.23 @ 18:40) >    IMPRESSION: Normal CTA of the neck.    < end of copied text >    < from: CT Brain Perfusion Maps Stroke (12.06.23 @ 18:37) >    IMPRESSION: Normal CT perfusion study.    < end of copied text >        -----------------------------------------------------------------------------------------

## 2023-12-07 NOTE — DISCHARGE NOTE PROVIDER - NSDCCPCAREPLAN_GEN_ALL_CORE_FT
PRINCIPAL DISCHARGE DIAGNOSIS  Diagnosis: Gait disturbance  Assessment and Plan of Treatment: How is the cause of dizziness diagnosed? Your healthcare provider may ask when the dizziness started. Tell the provider if you have dizzy spells, and how long they last. Tell the provider what happens before you become dizzy. The provider will ask if you have other health conditions and if you take any medicines. The provider will check your blood pressure and pulse to see if your dizziness may be related to your heart. Your balance, strength, reflexes, and the way you walk may also be checked. You may need any of the following tests to help find the cause of your dizziness:  An EKG records the electrical activity of your heart. An EKG can be used to check for an abnormal heart beat or heart damage.  Blood tests will check your blood sugar level, infection, and your blood cell levels.  CT or MRI pictures check for a stroke, head injury, or brain tumor. They also check for brain bleeding or swelling. You may be given contrast liquid to help your brain show up better in the pictures. Tell the healthcare provider if you have ever had an allergic reaction to contrast liquid. Do not enter the MRI room with anything metal. Metal can cause serious injury. Tell the healthcare provider if you have any metal in or on your body.  How is dizziness treated? Treatment will depend on the cause of your dizziness.   How can I manage my symptoms?  Do not drive or operate heavy machinery when you are dizzy.  Get up slowly from sitting or lying down.  Drink plenty of liquids. Liquids help prevent dehydration. Ask how much liquid to drink each day and which liquids are best for you.  When should I seek immediate care?  You have a headache and a stiff neck.  You have shaking chills and a fever.  You vomit over and over with no relief.  Your vomit or bowel movements are red or black.  You were started on a daily baby aspirin, please continue to take this daily and let your PCP and neurologist know that you started it.

## 2023-12-07 NOTE — H&P ADULT - NSHPPHYSICALEXAM_GEN_ALL_CORE
Physical exam:  General: No acute distress, awake and alert  Cardiovascular: Regular rate and rhythm, no murmurs, rubs, or gallops. No bruits  Pulmonary: Anterior breath sounds clear bilaterally, no crackles or wheezing. No use of accessory muscles  GI: Abdomen soft, non-tender, non-distended    Neurologic:  -Mental status: Awake, alert, oriented to person, place, and time. Speech is fluent with intact naming, repetition, and comprehension, no dysarthria. Recent and remote memory intact. Follows commands. Attention/concentration intact. Fund of knowledge appropriate.  -Cranial nerves:   II: Visual fields are full to confrontation.  III, IV, VI: Extraocular movements are intact without nystagmus. Pupils equally round and reactive to light  V:  Facial sensation V1-V3 equal and intact   VII: Face is symmetric with normal eye closure and smile  VIII: Hearing is bilaterally intact to finger rub  IX, X: Uvula is midline and soft palate rises symmetrically  XI: Head turning and shoulder shrug are intact.  XII: Tongue protrudes midline  Motor: Normal bulk and tone. No pronator drift. Strength bilateral upper extremity 5/5, bilateral lower extremities 5/5.  Rapid alternating movements intact and symmetric  Sensation: Intact to light touch bilaterally. No neglect or extinction on double simultaneous testing.  Coordination: No dysmetria on finger-to-nose and heel-to-shin bilaterally  Reflexes: Downgoing toes bilaterally   Gait: Narrow gait and steady    NIHSS: **** ASPECT Score: *** ICH Score: *** (GCS) Physical exam:  General: No acute distress, awake and alert  Cardiovascular: Regular rate and rhythm  Pulmonary:  No use of accessory muscles  GI: Abdomen soft, non-tender, non-distended    Neurologic:  -Mental status: Awake, alert, oriented to person, place, and time. Speech is fluent with intact naming, repetition, and comprehension, no dysarthria. Recent and remote memory intact. Follows commands. Attention/concentration intact. Fund of knowledge appropriate.  -Cranial nerves:   II: Visual fields are full to confrontation.  III, IV, VI: Extraocular movements are intact without nystagmus. Pupils equally round and reactive to light  V:  Facial sensation V1-V3 equal and intact   VII: Face is symmetric with normal eye closure and smile  Motor: Normal bulk and tone. No pronator drift. Strength bilateral upper extremity 5/5, bilateral lower extremities 5/5.  Rapid alternating movements intact and symmetric  Sensation: Intact to light touch bilaterally. No neglect or extinction on double simultaneous testing.  Coordination: Initially, thought to have observed some wavering while performing FTN with left upper extremity, but on reevaluation appears without ataxia. No ataxia and heel-to-shin bilaterally    NIHSS: 0 Physical exam:  General: No acute distress, awake and alert  Cardiovascular: Regular rate and rhythm  Pulmonary:  No use of accessory muscles  GI: Abdomen soft, non-tender, non-distended    Neurologic:  -Mental status: Awake, alert, oriented to person, place, and time. Speech is fluent with intact naming, repetition, and comprehension, no dysarthria. Recent and remote memory intact. Follows commands. Attention/concentration intact. Fund of knowledge appropriate.  -Cranial nerves:   II: Visual fields are full to confrontation.  III, IV, VI: Extraocular movements are intact without nystagmus. Pupils equally round and reactive to light  V:  Facial sensation V1-V3 equal and intact   VII: Face is symmetric with normal eye closure and smile  Motor: Normal bulk and tone. No pronator drift. Strength bilateral upper extremity 5/5, bilateral lower extremities 5/5.  Rapid alternating movements intact and symmetric  Sensation: Intact to light touch bilaterally. No neglect or extinction on double simultaneous testing.  Coordination: Initially, thought to have observed some wavering while performing FTN with right upper extremity, but on reevaluation appears without ataxia. No ataxia and heel-to-shin bilaterally    NIHSS: 0

## 2023-12-07 NOTE — CONSULT NOTE ADULT - PROBLEM SELECTOR RECOMMENDATION 9
unclear etiology; Pt. denies sensation of room spinning, N/V, tinnitus; sx improved; no e/o stroke on MRI brain; cont. work-up and mgmt per Neuro; PT/OT; CTH shows ventriculomegaly, c/f NPH, for which Pt. will f/u w/ her neurologist as outpatient; outpatient Neurosurgery f/u also arranged (see below)

## 2023-12-07 NOTE — H&P ADULT - ASSESSMENT
Neuro  #CVA vs. 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  - Stroke education    Cards  #HTN  - permissive hypertension, Goal -180  - hold home blood pressure medication for now  - obtain TTE      #HLD  - LDL results: pending    Pulm  - call provider if SPO2 < 94%    GI  #Nutrition/Fluids/Electrolytes   - replete K<4 and Mg <2  - Diet: DASH    Renal  - daily BMP    Infectious disease  #recent UTI  - UA with positive nitrites    Endocrine  - A1C results: pending      - TSH results: pending    DVT Prophylaxis  - lovenox sq for DVT prophylaxis   - SCDs for DVT prophylaxis       Dispo: pending PT/OT eval     Discussed daily hospital plans and goals with patient     Discussed with Neurology Attending Dr. Nelson 78y Female with PMHx of long COVID following 4th Moderna vaccine, who initially presented to [facility] with worsening dizziness and unsteady gait. Patient reports for the past 1 month she has been experiencing increased dizziness and feeling unsteady while walking. She underwent a brain MRI 1 month ago which was "normal" per patient. She was walking yesterday evening in the [facility] when she became very dizzy and felt as though "she was taking up the whole sidewalk while walking". She denies a room spinning sensation. She also endorses occasional ?binocular diplopia, that she is able to "get rid of her own". On evaluation at [facility], she states the doctor said she was leaning to the left and had a wide based gait. Imaging performed at [facility] negative except for a 1.7cm L supraclinoid ICA seen on CTA. Of note, patient was diagnosed with a UTI yesterday and is currently receiving Bactrim and also reports she has been gradually increased her naltrexone dose. She denies any headaches or nausea.     Neuro  #CVA vs. 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  - Stroke education    Cards  #HTN  - permissive hypertension, Goal -180  - hold home blood pressure medication for now  - obtain TTE      #HLD  - initiated atorvastatin 20mg daily for now  - LDL results: pending    Pulm  - call provider if SPO2 < 94%    GI  #Nutrition/Fluids/Electrolytes   - replete K<4 and Mg <2  - Diet: DASH    Renal  - daily BMP    Infectious disease  #recent UTI  - UA with positive nitrites    Endocrine  - A1C results: pending      - TSH results: pending    DVT Prophylaxis  - lovenox sq for DVT prophylaxis   - SCDs for DVT prophylaxis       Dispo: pending PT/OT eval     Discussed daily hospital plans and goals with patient     Discussed with Neurology Attending Dr. Nelson 78y Female with PMHx of long COVID following 4th Moderna vaccine, who initially presented to [facility] with worsening dizziness and unsteady gait. Patient reports for the past 1 month she has been experiencing increased dizziness and feeling unsteady while walking. She underwent a brain MRI 1 month ago which was "normal" per patient. She was walking yesterday evening in the [facility] when she became very dizzy and felt as though "she was taking up the whole sidewalk while walking". She denies a room spinning sensation. She also endorses occasional ?binocular diplopia, that she is able to "get rid of her own". On evaluation at [facility], she states the doctor said she was leaning to the left and had a wide based gait. Imaging performed at [facility] negative except for a 1.7cm L supraclinoid ICA seen on CTA. Of note, patient was diagnosed with a UTI yesterday and is currently receiving Bactrim and also reports she has been gradually increased her naltrexone dose. She denies any headaches or nausea.     Neuro  #CVA vs. 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  - Stroke education    Cards  #HTN  - permissive hypertension, Goal -180  - hold home blood pressure medication for now  - obtain TTE      #HLD  - initiated atorvastatin 20mg daily for now  - LDL results: pending    Pulm  - call provider if SPO2 < 94%    GI  #Nutrition/Fluids/Electrolytes   - replete K<4 and Mg <2  - Diet: DASH    Renal  - daily BMP    Infectious disease  #recent UTI  - UA with positive nitrites    Endocrine  - A1C results: pending      - TSH results: pending    DVT Prophylaxis  - lovenox sq for DVT prophylaxis   - SCDs for DVT prophylaxis       Dispo: pending PT/OT eval     Discussed daily hospital plans and goals with patient     Discussed with Neurology Attending Dr. Nelson 78y Female with PMHx of long COVID following 4th Moderna vaccine, who initially presented to [facility] with worsening dizziness and unsteady gait. Patient reports for the past 1 month she has been experiencing increased dizziness and feeling unsteady while walking. She underwent a brain MRI 1 month ago which was "normal" per patient. She was walking yesterday evening in the [facility] when she became very dizzy and felt as though "she was taking up the whole sidewalk while walking". She denies a room spinning sensation. She also endorses occasional ?binocular diplopia, that she is able to "get rid of her own". On evaluation at [facility], she states the doctor said she was leaning to the left and had a wide based gait. Imaging performed at [facility] negative except for a 1.7cm L supraclinoid ICA seen on CTA. Of note, patient was diagnosed with a UTI yesterday and is currently receiving Bactrim and also reports she has been gradually increasing her naltrexone dose. She denies any headaches or nausea.     Neuro  #CVA vs. 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  - Stroke education    Cards  #HTN  - permissive hypertension, Goal -180  - hold home blood pressure medication for now  - obtain TTE      #HLD  - initiated atorvastatin 20mg daily for now  - LDL results: pending    Pulm  - call provider if SPO2 < 94%    GI  #Nutrition/Fluids/Electrolytes   - replete K<4 and Mg <2  - Diet: DASH    Renal  - daily BMP    Infectious disease  #recent UTI  - UA with positive nitrites    Endocrine  - A1C results: pending      - TSH results: pending    DVT Prophylaxis  - lovenox sq for DVT prophylaxis   - SCDs for DVT prophylaxis       Dispo: pending PT/OT eval     Discussed daily hospital plans and goals with patient     Discussed with Neurology Attending Dr. Nelson 78y Female with PMHx of long COVID following 4th Moderna vaccine, who initially presented to [facility] with worsening dizziness and unsteady gait. Patient reports for the past 1 month she has been experiencing increased dizziness and feeling unsteady while walking. She underwent a brain MRI 1 month ago which was "normal" per patient. She was walking yesterday evening in the [facility] when she became very dizzy and felt as though "she was taking up the whole sidewalk while walking". She denies a room spinning sensation. She also endorses occasional ?binocular diplopia, that she is able to "get rid of her own". On evaluation at [facility], she states the doctor said she was leaning to the left and had a wide based gait. Imaging performed at [facility] negative except for a 1.7cm L supraclinoid ICA seen on CTA. Of note, patient was diagnosed with a UTI yesterday and is currently receiving Bactrim and also reports she has been gradually increasing her naltrexone dose. She denies any headaches or nausea.     Neuro  #CVA vs. 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  - Stroke education    Cards  #HTN  - permissive hypertension, Goal -180  - hold home blood pressure medication for now  - obtain TTE      #HLD  - initiated atorvastatin 20mg daily for now  - LDL results: pending    Pulm  - call provider if SPO2 < 94%    GI  #Nutrition/Fluids/Electrolytes   - replete K<4 and Mg <2  - Diet: DASH    Renal  - daily BMP    Infectious disease  #recent UTI  - UA with positive nitrites    Endocrine  - A1C results: pending      - TSH results: pending    DVT Prophylaxis  - lovenox sq for DVT prophylaxis   - SCDs for DVT prophylaxis       Dispo: pending PT/OT eval     Discussed daily hospital plans and goals with patient     Discussed with Neurology Attending Dr. Nelson

## 2023-12-07 NOTE — OCCUPATIONAL THERAPY INITIAL EVALUATION ADULT - GENERAL OBSERVATIONS, REHAB EVAL
OT IE completed. MRS 1. Pt admitted to [facility] for stroke evaluation. Orders received, chart reviewed, pt cleared for OT by RINA Baxter. Pt received semi supine in bed, NAD, +heplock, +tele. Pt A&Ox4, agreeable to OT, and tolerated session well.  Further skilled OT services are not warranted, as pt presents at baseline functional independence. OT IE completed. MRS 1. Pt admitted to [facility] for stroke evaluation. Orders received, chart reviewed, pt cleared for OT by RINA Baxter. Pt received semi supine in bed, NAD, +heplock, +tele. Pt A&Ox4, agreeable to OT, and tolerated session well.  Further skilled OT services are not warranted, as pt presents at baseline functional independence.

## 2023-12-07 NOTE — PHYSICAL THERAPY INITIAL EVALUATION ADULT - GAIT DEVIATIONS NOTED, PT EVAL
Pt demo adequate step length and daren. Initially displayed dec weight shifting abilities however improved w/ further amb. Good aerobic endurance, no SOB noted. Demo ability to perform standing heel raises and standing hip marches w/o LOB.

## 2023-12-07 NOTE — PROGRESS NOTE ADULT - ASSESSMENT
78y Female with PMHx of long COVID following 4th Moderna vaccine, who initially presented to [facility] with worsening dizziness and unsteady gait. Patient reports for the past 1 month she has been experiencing increased dizziness and feeling unsteady while walking. She underwent a brain MRI 1 month ago which was "normal" per patient. She was walking yesterday evening in the [facility] when she became very dizzy and felt as though "she was taking up the whole sidewalk while walking". She denies a room spinning sensation. She also endorses occasional ?binocular diplopia, that she is able to "get rid of her own". On evaluation at [facility], she states the doctor said she was leaning to the left and had a wide based gait. Imaging performed at [facility] negative except for a 1.7cm L supraclinoid ICA seen on CTA. Of note, patient was diagnosed with a UTI yesterday and is currently receiving Bactrim and also reports she has been gradually increasing her naltrexone dose. She denies any headaches or nausea.  78y Female with PMHx of long COVID following 4th Moderna vaccine, who initially presented to [facility] with worsening dizziness and unsteady gait. Patient reports for the past 1 month she has been experiencing increased dizziness and feeling unsteady while walking. She underwent a brain MRI 1 month ago which was "normal" per patient. She was walking yesterday evening in the [facility] when she became very dizzy and felt as though "she was taking up the whole sidewalk while walking". She denies a room spinning sensation. She also endorses occasional ?binocular diplopia, that she is able to "get rid of her own". On evaluation at [facility], she states the doctor said she was leaning to the left and had a wide based gait. Imaging performed at [facility] negative except for a 1.7cm L supraclinoid ICA seen on CTA. Of note, patient was diagnosed with a UTI yesterday and is currently receiving Bactrim and also reports she has been gradually increasing her naltrexone dose. She denies any headaches or nausea.

## 2023-12-07 NOTE — DISCHARGE NOTE PROVIDER - CARE PROVIDERS DIRECT ADDRESSES
,[email or web address]

## 2023-12-07 NOTE — CONSULT NOTE ADULT - SUBJECTIVE AND OBJECTIVE BOX
Patient is a 78y old  Female who presents with a chief complaint of dizziness and unsteady gait (07 Dec 2023 04:47)      HPI:   **STROKE HPI***    HPI: 78y Female with PMHx of long COVID following 4th Moderna vaccine, who initially presented to [facility] with worsening dizziness and unsteady gait. Patient reports for the past 1 month she has been experiencing increased dizziness and feeling unsteady while walking. She underwent a brain MRI 1 month ago which was "normal" per patient. She was walking yesterday evening in the [facility] when she became very dizzy and felt as though "she was taking up the whole sidewalk while walking". She denies a room spinning sensation. She also endorses occasional ?binocular diplopia, that she is able to "get rid of her own". On evaluation at [facility], she states the doctor said she was leaning to the left and had a wide based gait. Imaging performed at [facility] negative except for a 1.7cm L supraclinoid ICA seen on CTA. Of note, patient was diagnosed with a UTI yesterday and is currently receiving Bactrim and also reports she has been gradually increasing her naltrexone dose. She denies any headaches or nausea.     PAST MEDICAL & SURGICAL HISTORY:  Sleep apnea      UTI (urinary tract infection)      Pancreatitis      Status post appendectomy      H/O foot surgery          FAMILY HISTORY:            T(C): 36.4 (12-07-23 @ 04:09), Max: 36.8 (12-07-23 @ 00:30)  HR: 60 (12-07-23 @ 04:09) (60 - 78)  BP: 122/65 (12-07-23 @ 04:09) (119/75 - 144/86)  RR: 18 (12-07-23 @ 04:09) (15 - 18)  SpO2: 96% (12-07-23 @ 04:09) (96% - 97%)    MEDICATION RECONCILIATION   MEDICATIONS  (STANDING):  enoxaparin Injectable 40 milliGRAM(s) SubCutaneous every 24 hours  influenza  Vaccine (HIGH DOSE) 0.7 milliLiter(s) IntraMuscular once    MEDICATIONS  (PRN):    Allergies    No Known Allergies    Intolerances    Demerol HCl (Other (Mild))  Benadryl (Other (Mild))    Vital Signs Last 24 Hrs  T(C): 36.4 (07 Dec 2023 04:09), Max: 36.8 (07 Dec 2023 00:30)  T(F): 97.5 (07 Dec 2023 04:09), Max: 98.2 (07 Dec 2023 00:30)  HR: 60 (07 Dec 2023 04:09) (60 - 78)  BP: 122/65 (07 Dec 2023 04:09) (119/75 - 144/86)  BP(mean): --  RR: 18 (07 Dec 2023 04:09) (15 - 18)  SpO2: 96% (07 Dec 2023 04:09) (96% - 97%)    Parameters below as of 07 Dec 2023 04:09  Patient On (Oxygen Delivery Method): room air       (07 Dec 2023 04:47)    PAST MEDICAL & SURGICAL HISTORY:  Sleep apnea      UTI (urinary tract infection)      Pancreatitis      Status post appendectomy      H/O foot surgery        MEDICATIONS  (STANDING):  aspirin enteric coated 81 milliGRAM(s) Oral daily  atorvastatin 20 milliGRAM(s) Oral at bedtime  enoxaparin Injectable 40 milliGRAM(s) SubCutaneous every 24 hours  influenza  Vaccine (HIGH DOSE) 0.7 milliLiter(s) IntraMuscular once  trimethoprim   80 mG/sulfamethoxazole 400 mG 1 Tablet(s) Oral two times a day    MEDICATIONS  (PRN):          FAMILY HISTORY:      CBC Full  -  ( 06 Dec 2023 18:11 )  WBC Count : 6.31 K/uL  RBC Count : 4.59 M/uL  Hemoglobin : 13.9 g/dL  Hematocrit : 42.6 %  Platelet Count - Automated : 193 K/uL  Mean Cell Volume : 92.8 fl  Mean Cell Hemoglobin : 30.3 pg  Mean Cell Hemoglobin Concentration : 32.6 gm/dL  Auto Neutrophil # : 3.20 K/uL  Auto Lymphocyte # : 2.14 K/uL  Auto Monocyte # : 0.62 K/uL  Auto Eosinophil # : 0.27 K/uL  Auto Basophil # : 0.06 K/uL  Auto Neutrophil % : 50.7 %  Auto Lymphocyte % : 33.9 %  Auto Monocyte % : 9.8 %  Auto Eosinophil % : 4.3 %  Auto Basophil % : 1.0 %      12-06    136  |  101  |  14  ----------------------------<  104<H>  4.9   |  30  |  0.91    Ca    9.2      06 Dec 2023 18:11    TPro  7.6  /  Alb  3.6  /  TBili  0.4  /  DBili  x   /  AST  30  /  ALT  20  /  AlkPhos  86  12-06      Urinalysis Basic - ( 06 Dec 2023 18:11 )    Color: x / Appearance: x / SG: x / pH: x  Gluc: 104 mg/dL / Ketone: x  / Bili: x / Urobili: x   Blood: x / Protein: x / Nitrite: x   Leuk Esterase: x / RBC: x / WBC x   Sq Epi: x / Non Sq Epi: x / Bacteria: x        Radiology :     < from: CT Brain Stroke Protocol (12.06.23 @ 18:13) >  ACC: [id number] EXAM:  CT BRAIN STROKE PROTOCOL   ORDERED BY: SALIMA CASAS     PROCEDURE DATE:  12/06/2023          INTERPRETATION:  PROCEDURE: CT head without intravenous contrast    INDICATION: Dizziness; gait disturbance    TECHNIQUE: Serial axial images were obtained from the skull base to the   vertex. Sagittal and coronal reformatted images were obtained. The images   were reviewed in brain and bone windows.    COMPARISON: CT head 3/3/2022; MRI brain 10/14/2023    FINDINGS: The CT examination demonstrates disproportionate enlargement of   the ventricles when compared to cortical sulci which may be secondary to   communicating hydrocephalus. In addition, there is upward bowing of the   corpus callosum and dilatation of the sylvian fissures laterally. These   findings may be secondary to normal pressure hydrocephalus (NPH) and   clinical correlation is recommended. The ventricles are stable in size.    There is no midline shift or extra axial collections. The gray white   differentiation appears within normal limits. There is no intracranial   hemorrhage or acute transcortical infarct. There are minimum patchy areas   of hypodensity within the periventricular white matter which is   nonspecific and may represent the sequela of small vessel ischemic   disease in this patient.    The bony windows demonstrates no fractures. The left lens is absent   secondary to prior cataract surgery. The visualized paranasal sinuses are   within normal limits. The mastoid air cells are well aerated.    The study was performed at 6:11 pm and the above findings were discussed   with Dr. Casas at 6:19 pm.    IMPRESSION: No intracranial hemorrhage or acute transcortical infarct.   Findings suggestive of NPH and clinical correlation is recommended.    < from: CT Brain Perfusion Maps Stroke (12.06.23 @ 18:37) >  ACC: [id number] EXAM:  CT BRAIN PERFUSION MAPS STROKE   ORDERED BY: SALIMA CASAS     PROCEDURE DATE:  12/06/2023          INTERPRETATION:  PROCEDURE: CT Perfusion with intravenous contrast.    INDICATION: Dizziness; gait disturbance    TECHNIQUE: Following the intravenous administration of 45 ml of Omnipaque   350, serial axial images were obtained through the brain. The CT   perfusion data set was post processed per [facility] protocol   generating color maps of CBF, CBV, MTT, and Tmax.    COMPARISON: None    FINDINGS: The CT perfusion study demonstrates no perfusion abnormality.   There is no mismatch volume.    IMPRESSION: Normal CT perfusion study.    < from: CT Angio Neck Stroke Protocol w/ IV Cont (12.06.23 @ 18:40) >  ACC: [id number] EXAM:  CT ANGIO NECK STROKE PROTCL IC   ORDERED BY: SALIMA CASAS     ACC: [id number] EXAM:  CT ANGIO BRAIN STROKE PROTC IC   ORDERED BY: SALIMA CASAS     PROCEDURE DATE:  12/06/2023          INTERPRETATION:  PROCEDURE: CTA brain with and without intravenous   contrast.    INDICATION: Dizziness; gait disturbance    TECHNIQUE: Multiple thin section axial images were obtained through the   [place] of [place] following the intravenous injection of 80 ml of   Omnipaque 350. Multiple 3-D reformatted images were generated from the   axial cuts.    COMPARISON: None    FINDINGS: The CTA examination demonstrates a approximately 1.7 mm   outpouching involving the left supraclinoid ICA which projects   posteriorly (series 4 image 393 and series 602 image 41). This may   represent an aneurysm. The internal carotid arteries are otherwise normal   in caliber. There is a normal bifurcation into the A1 and M1 segments.   The left A1 and A1 A2 segments are mildly underdeveloped. Thereis a   fenestration within the right A2 segment. There is a normal MCA   bifurcation. The vertebral arteries are normal in caliber. The basilar   artery is normal in caliber. There is a normal bifurcation into the   posterior cerebral arteries.    IMPRESSION: No large vessel occlusion. 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      PROCEDURE: CTA neck with and without intravenous contrast.    INDICATION: Dizziness; gait disturbance    TECHNIQUE: Multiple axial thin section were obtained through the neck   following the intravenous injection of 80 ml of Omnipaque 350. Multiple   3-D reformatted images were generated from the axial cuts.    COMPARISON: None    FINDINGS: The CTA examination demonstrates the right common carotid   arteryto be normal in caliber. There is a normal bifurcation into the   right internal and external carotid arteries. There is no hemodynamically   significant stenosis.    The left common carotid artery is normal in caliber. There is a normal   bifurcation into the left internal and external carotid arteries. There   is no hemodynamically significant stenosis.    The vertebral arteries are normal in caliber.    The aortic arch appears intact without narrowing of the origin of the   great vessels.    Evaluation of the cervical spine demonstrates Grade 1 anterolisthesis of   C2 on C3. There is cervical spondylosis at the C3/4 through the C6/7   levels.    IMPRESSION: Normal CTA of the neck.       Review of Systems : per HPI         Vital Signs Last 24 Hrs  T(C): 36.4 (07 Dec 2023 06:20), Max: 36.8 (07 Dec 2023 00:30)  T(F): 97.6 (07 Dec 2023 06:20), Max: 98.2 (07 Dec 2023 00:30)  HR: 58 (07 Dec 2023 06:20) (58 - 78)  BP: 135/83 (07 Dec 2023 06:20) (119/75 - 144/86)  BP(mean): --  RR: 18 (07 Dec 2023 06:20) (15 - 18)  SpO2: 96% (07 Dec 2023 06:20) (96% - 97%)    Parameters below as of 07 Dec 2023 06:20  Patient On (Oxygen Delivery Method): room air            Physical Exam :  78 y o woman lying comfortably in semi Wagner's position , awake , alert , no acute complaints , feels tired     Head : normocephalic , atraumatic    Eyes : PERRLA , EOMI , no nystagmus , sclera anicteric    ENT / FACE : neg nasal discharge , uvula midline , no oropharyngeal erythema / exudate    Neck : supple , negative JVD , negative carotid bruits , no thyromegaly    Chest : CTA bilaterally , neg wheeze / rhonchi / rales / crackles / egophany    Cardiovascular : regular rate and rhythm , neg murmurs / rubs / gallops    Abdomen : soft , non distended , non tender to palpation in all 4 quadrants ,  normal bowel sounds     Extremities : WWP , neg cyanosis /clubbing / edema     Neurologic Exam :     Alert and oriented to person , place , date/year , speech fluent w/o dysarthria , follows commands , recent and remote memory intact , repetition intact , comprehension intact ,  attention/concentration intact , fund of knowledge appropriate    Cranial Nerves:           II :                         pupils equal , round and reactive to light , visual fields intact         III/ IV/VI :              extraocular movements intact , neg nystagmus , neg ptosis        V :                        facial sensation intact , V1-3 normal        VII :                      face symmetric , no droop , normal eye closure and smile        VIII :                     hearing intact to finger rub bilaterally        IX and X :             no hoarseness , gag intact , palate/ uvula rise symmetrically        XI :                       SCM / trapezius strength intact bilateral        XII :                      no tongue deviation       Motor Exam:                Right UE:                     no focal weakness ,  > 3+/5 throughout  , no pronator drift     Left UE:                       no focal weakness ,  > 3+/5 throughout  , no pronator drift         Right LE:          no focal weakness ,  > 3+/5 throughout          Left LE:          no focal weakness ,  > 3+/5 throughout           Sensation :         intact to light touch x 4 extremities                            no neglect or extinction on double simultaneous testing    DTR :           biceps/brachioradialis : equal                            patella/ankle : equal           neg Babinski           Coordination :            Finger to Nose :  neg dysmetria bilaterally      Gait :  not tested          PM&R Impression : admitted for c/o dizziness and unsteady gait x 1 m     - no acute pathology on CT brain imaging , ? NPH , MRI pending      - no focal neurologic deficits         Recommendations / Plan :       1) Physical / Occupational therapy focusing on therapeutic exercises , equipment evaluation , bed mobility/transfer out of bed evaluation , progressive ambulation with assistive devices prn .    2) Current disposition plan recommendation  :    pending functional progress            Patient is a 78y old  Female who presents with a chief complaint of dizziness and unsteady gait (07 Dec 2023 04:47)      HPI:   **STROKE HPI***    HPI: 78y Female with PMHx of long COVID following 4th Moderna vaccine, who initially presented to [facility] with worsening dizziness and unsteady gait. Patient reports for the past 1 month she has been experiencing increased dizziness and feeling unsteady while walking. She underwent a brain MRI 1 month ago which was "normal" per patient. She was walking yesterday evening in the [facility] when she became very dizzy and felt as though "she was taking up the whole sidewalk while walking". She denies a room spinning sensation. She also endorses occasional ?binocular diplopia, that she is able to "get rid of her own". On evaluation at [facility], she states the doctor said she was leaning to the left and had a wide based gait. Imaging performed at [facility] negative except for a 1.7cm L supraclinoid ICA seen on CTA. Of note, patient was diagnosed with a UTI yesterday and is currently receiving Bactrim and also reports she has been gradually increasing her naltrexone dose. She denies any headaches or nausea.     PAST MEDICAL & SURGICAL HISTORY:  Sleep apnea      UTI (urinary tract infection)      Pancreatitis      Status post appendectomy      H/O foot surgery          FAMILY HISTORY:            T(C): 36.4 (12-07-23 @ 04:09), Max: 36.8 (12-07-23 @ 00:30)  HR: 60 (12-07-23 @ 04:09) (60 - 78)  BP: 122/65 (12-07-23 @ 04:09) (119/75 - 144/86)  RR: 18 (12-07-23 @ 04:09) (15 - 18)  SpO2: 96% (12-07-23 @ 04:09) (96% - 97%)    MEDICATION RECONCILIATION   MEDICATIONS  (STANDING):  enoxaparin Injectable 40 milliGRAM(s) SubCutaneous every 24 hours  influenza  Vaccine (HIGH DOSE) 0.7 milliLiter(s) IntraMuscular once    MEDICATIONS  (PRN):    Allergies    No Known Allergies    Intolerances    Demerol HCl (Other (Mild))  Benadryl (Other (Mild))    Vital Signs Last 24 Hrs  T(C): 36.4 (07 Dec 2023 04:09), Max: 36.8 (07 Dec 2023 00:30)  T(F): 97.5 (07 Dec 2023 04:09), Max: 98.2 (07 Dec 2023 00:30)  HR: 60 (07 Dec 2023 04:09) (60 - 78)  BP: 122/65 (07 Dec 2023 04:09) (119/75 - 144/86)  BP(mean): --  RR: 18 (07 Dec 2023 04:09) (15 - 18)  SpO2: 96% (07 Dec 2023 04:09) (96% - 97%)    Parameters below as of 07 Dec 2023 04:09  Patient On (Oxygen Delivery Method): room air       (07 Dec 2023 04:47)    PAST MEDICAL & SURGICAL HISTORY:  Sleep apnea      UTI (urinary tract infection)      Pancreatitis      Status post appendectomy      H/O foot surgery        MEDICATIONS  (STANDING):  aspirin enteric coated 81 milliGRAM(s) Oral daily  atorvastatin 20 milliGRAM(s) Oral at bedtime  enoxaparin Injectable 40 milliGRAM(s) SubCutaneous every 24 hours  influenza  Vaccine (HIGH DOSE) 0.7 milliLiter(s) IntraMuscular once  trimethoprim   80 mG/sulfamethoxazole 400 mG 1 Tablet(s) Oral two times a day    MEDICATIONS  (PRN):          FAMILY HISTORY:      CBC Full  -  ( 06 Dec 2023 18:11 )  WBC Count : 6.31 K/uL  RBC Count : 4.59 M/uL  Hemoglobin : 13.9 g/dL  Hematocrit : 42.6 %  Platelet Count - Automated : 193 K/uL  Mean Cell Volume : 92.8 fl  Mean Cell Hemoglobin : 30.3 pg  Mean Cell Hemoglobin Concentration : 32.6 gm/dL  Auto Neutrophil # : 3.20 K/uL  Auto Lymphocyte # : 2.14 K/uL  Auto Monocyte # : 0.62 K/uL  Auto Eosinophil # : 0.27 K/uL  Auto Basophil # : 0.06 K/uL  Auto Neutrophil % : 50.7 %  Auto Lymphocyte % : 33.9 %  Auto Monocyte % : 9.8 %  Auto Eosinophil % : 4.3 %  Auto Basophil % : 1.0 %      12-06    136  |  101  |  14  ----------------------------<  104<H>  4.9   |  30  |  0.91    Ca    9.2      06 Dec 2023 18:11    TPro  7.6  /  Alb  3.6  /  TBili  0.4  /  DBili  x   /  AST  30  /  ALT  20  /  AlkPhos  86  12-06      Urinalysis Basic - ( 06 Dec 2023 18:11 )    Color: x / Appearance: x / SG: x / pH: x  Gluc: 104 mg/dL / Ketone: x  / Bili: x / Urobili: x   Blood: x / Protein: x / Nitrite: x   Leuk Esterase: x / RBC: x / WBC x   Sq Epi: x / Non Sq Epi: x / Bacteria: x        Radiology :     < from: CT Brain Stroke Protocol (12.06.23 @ 18:13) >  ACC: [id number] EXAM:  CT BRAIN STROKE PROTOCOL   ORDERED BY: SALIMA CASAS     PROCEDURE DATE:  12/06/2023          INTERPRETATION:  PROCEDURE: CT head without intravenous contrast    INDICATION: Dizziness; gait disturbance    TECHNIQUE: Serial axial images were obtained from the skull base to the   vertex. Sagittal and coronal reformatted images were obtained. The images   were reviewed in brain and bone windows.    COMPARISON: CT head 3/3/2022; MRI brain 10/14/2023    FINDINGS: The CT examination demonstrates disproportionate enlargement of   the ventricles when compared to cortical sulci which may be secondary to   communicating hydrocephalus. In addition, there is upward bowing of the   corpus callosum and dilatation of the sylvian fissures laterally. These   findings may be secondary to normal pressure hydrocephalus (NPH) and   clinical correlation is recommended. The ventricles are stable in size.    There is no midline shift or extra axial collections. The gray white   differentiation appears within normal limits. There is no intracranial   hemorrhage or acute transcortical infarct. There are minimum patchy areas   of hypodensity within the periventricular white matter which is   nonspecific and may represent the sequela of small vessel ischemic   disease in this patient.    The bony windows demonstrates no fractures. The left lens is absent   secondary to prior cataract surgery. The visualized paranasal sinuses are   within normal limits. The mastoid air cells are well aerated.    The study was performed at 6:11 pm and the above findings were discussed   with Dr. Casas at 6:19 pm.    IMPRESSION: No intracranial hemorrhage or acute transcortical infarct.   Findings suggestive of NPH and clinical correlation is recommended.    < from: CT Brain Perfusion Maps Stroke (12.06.23 @ 18:37) >  ACC: [id number] EXAM:  CT BRAIN PERFUSION MAPS STROKE   ORDERED BY: SALIMA CASAS     PROCEDURE DATE:  12/06/2023          INTERPRETATION:  PROCEDURE: CT Perfusion with intravenous contrast.    INDICATION: Dizziness; gait disturbance    TECHNIQUE: Following the intravenous administration of 45 ml of Omnipaque   350, serial axial images were obtained through the brain. The CT   perfusion data set was post processed per [facility] protocol   generating color maps of CBF, CBV, MTT, and Tmax.    COMPARISON: None    FINDINGS: The CT perfusion study demonstrates no perfusion abnormality.   There is no mismatch volume.    IMPRESSION: Normal CT perfusion study.    < from: CT Angio Neck Stroke Protocol w/ IV Cont (12.06.23 @ 18:40) >  ACC: [id number] EXAM:  CT ANGIO NECK STROKE PROTCL IC   ORDERED BY: SALIMA CASAS     ACC: [id number] EXAM:  CT ANGIO BRAIN STROKE PROTC IC   ORDERED BY: SALIMA CASAS     PROCEDURE DATE:  12/06/2023          INTERPRETATION:  PROCEDURE: CTA brain with and without intravenous   contrast.    INDICATION: Dizziness; gait disturbance    TECHNIQUE: Multiple thin section axial images were obtained through the   [place] of [place] following the intravenous injection of 80 ml of   Omnipaque 350. Multiple 3-D reformatted images were generated from the   axial cuts.    COMPARISON: None    FINDINGS: The CTA examination demonstrates a approximately 1.7 mm   outpouching involving the left supraclinoid ICA which projects   posteriorly (series 4 image 393 and series 602 image 41). This may   represent an aneurysm. The internal carotid arteries are otherwise normal   in caliber. There is a normal bifurcation into the A1 and M1 segments.   The left A1 and A1 A2 segments are mildly underdeveloped. Thereis a   fenestration within the right A2 segment. There is a normal MCA   bifurcation. The vertebral arteries are normal in caliber. The basilar   artery is normal in caliber. There is a normal bifurcation into the   posterior cerebral arteries.    IMPRESSION: No large vessel occlusion. 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      PROCEDURE: CTA neck with and without intravenous contrast.    INDICATION: Dizziness; gait disturbance    TECHNIQUE: Multiple axial thin section were obtained through the neck   following the intravenous injection of 80 ml of Omnipaque 350. Multiple   3-D reformatted images were generated from the axial cuts.    COMPARISON: None    FINDINGS: The CTA examination demonstrates the right common carotid   arteryto be normal in caliber. There is a normal bifurcation into the   right internal and external carotid arteries. There is no hemodynamically   significant stenosis.    The left common carotid artery is normal in caliber. There is a normal   bifurcation into the left internal and external carotid arteries. There   is no hemodynamically significant stenosis.    The vertebral arteries are normal in caliber.    The aortic arch appears intact without narrowing of the origin of the   great vessels.    Evaluation of the cervical spine demonstrates Grade 1 anterolisthesis of   C2 on C3. There is cervical spondylosis at the C3/4 through the C6/7   levels.    IMPRESSION: Normal CTA of the neck.       Review of Systems : per HPI         Vital Signs Last 24 Hrs  T(C): 36.4 (07 Dec 2023 06:20), Max: 36.8 (07 Dec 2023 00:30)  T(F): 97.6 (07 Dec 2023 06:20), Max: 98.2 (07 Dec 2023 00:30)  HR: 58 (07 Dec 2023 06:20) (58 - 78)  BP: 135/83 (07 Dec 2023 06:20) (119/75 - 144/86)  BP(mean): --  RR: 18 (07 Dec 2023 06:20) (15 - 18)  SpO2: 96% (07 Dec 2023 06:20) (96% - 97%)    Parameters below as of 07 Dec 2023 06:20  Patient On (Oxygen Delivery Method): room air            Physical Exam :  78 y o woman lying comfortably in semi Wagner's position , awake , alert , no acute complaints , feels tired     Head : normocephalic , atraumatic    Eyes : PERRLA , EOMI , no nystagmus , sclera anicteric    ENT / FACE : neg nasal discharge , uvula midline , no oropharyngeal erythema / exudate    Neck : supple , negative JVD , negative carotid bruits , no thyromegaly    Chest : CTA bilaterally , neg wheeze / rhonchi / rales / crackles / egophany    Cardiovascular : regular rate and rhythm , neg murmurs / rubs / gallops    Abdomen : soft , non distended , non tender to palpation in all 4 quadrants ,  normal bowel sounds     Extremities : WWP , neg cyanosis /clubbing / edema     Neurologic Exam :     Alert and oriented to person , place , date/year , speech fluent w/o dysarthria , follows commands , recent and remote memory intact , repetition intact , comprehension intact ,  attention/concentration intact , fund of knowledge appropriate    Cranial Nerves:           II :                         pupils equal , round and reactive to light , visual fields intact         III/ IV/VI :              extraocular movements intact , neg nystagmus , neg ptosis        V :                        facial sensation intact , V1-3 normal        VII :                      face symmetric , no droop , normal eye closure and smile        VIII :                     hearing intact to finger rub bilaterally        IX and X :             no hoarseness , gag intact , palate/ uvula rise symmetrically        XI :                       SCM / trapezius strength intact bilateral        XII :                      no tongue deviation       Motor Exam:                Right UE:                     no focal weakness ,  > 3+/5 throughout  , no pronator drift     Left UE:                       no focal weakness ,  > 3+/5 throughout  , no pronator drift         Right LE:          no focal weakness ,  > 3+/5 throughout          Left LE:          no focal weakness ,  > 3+/5 throughout           Sensation :         intact to light touch x 4 extremities                            no neglect or extinction on double simultaneous testing    DTR :           biceps/brachioradialis : equal                            patella/ankle : equal           neg Babinski           Coordination :            Finger to Nose :  neg dysmetria bilaterally      Gait :  not tested          PM&R Impression : admitted for c/o dizziness and unsteady gait x 1 m     - no acute pathology on CT brain imaging , ? NPH , MRI pending      - no focal neurologic deficits         Recommendations / Plan :       1) Physical / Occupational therapy focusing on therapeutic exercises , equipment evaluation , bed mobility/transfer out of bed evaluation , progressive ambulation with assistive devices prn .    2) Current disposition plan recommendation  :    pending functional progress

## 2023-12-07 NOTE — H&P ADULT - ATTENDING COMMENTS
The patient is an 78-year-old female with a history of long COVID syndrome with chronic intermittent dizziness and unsteady gait presenting with a more severe episode of similar symptoms.  MRI negative for acute ischemia.  TTE ngative. There was question of possible NPH.  Outpatient eval recommended with patient's general neurologist.  Will have patient follow-up with neurosurgery for small ICA aneurysm.

## 2023-12-07 NOTE — CONSULT NOTE ADULT - SUBJECTIVE AND OBJECTIVE BOX
HISTORY OF PRESENT ILLNESS:   78 year old female, PMH long covid, POTS, orthostatic hypotension (under care of [facility] neurologist and cardiologist) presented to [facility] last night for dizziness and gait disturbance. Pt went to walk in yesterday for some cold-like symptoms and was noted to have a wide and unsteady gait accompanied by some dizziness. She was encouraged to go to ED by walk-in provider. CTA performed at [facility] showed 1.7mm L supraclinoid aneurysm. Also of note, pt reports some mild urinary incontinence which started recently, was diagnosed with UTI yesterday and started on Bactrim.      PAST MEDICAL & SURGICAL HISTORY:  Sleep apnea  UTI (urinary tract infection)  Pancreatitis  Status post appendectomy  H/O foot surgery    FAMILY HISTORY:    SOCIAL HISTORY:  Tobacco Use: unknown  EtOH use:   Substance:    Allergies  No Known Allergies  Intolerances:  Demerol HCl (Other (Mild))  Benadryl (Other (Mild))      REVIEW OF SYSTEMS    General: no recent illnesses, no recent wt gain/loss, no chills  Skin/Breast: no rash, lumps, new moles, erythema, tenderness  Ophthalmologic: no change in vision, diplopia, pain, redness, tearing, dry eyes	  ENMT: no hearing loss, tinnitus, ear pain, vertigo, nasal congestion, epistaxis, sore throat  Respiratory and Thorax: no coughing, wheezing, recent URI, shortness of breath	  Cardiovascular: no chest pain, MIX, leg swelling, irregular rhythm   Gastrointestinal:	no nausea, vomiting, diarrhea, abd pain, bloody stool, heartburn  Genitourinary: + incontinence. no frequency, dysuria, hematuria  Musculoskeletal:	no joint pain, no joint swelling, no tenderness  Neurological: see HPI  Psychiatric: no confusion, no anxiousness, no depression   Hematology/Lymphatics:	no brusing, easy bleeding, LAD  Endocrine: no excess urination/thirst, heat/cold intolerance  Allergic/Immunologic: no urticaria, sneezing, recurrent infections      MEDICATIONS:  Antibiotics:    Neuro:    Anticoagulation:  aspirin enteric coated 81 milliGRAM(s) Oral daily  enoxaparin Injectable 40 milliGRAM(s) SubCutaneous every 24 hours    OTHER:  atorvastatin 20 milliGRAM(s) Oral at bedtime  influenza  Vaccine (HIGH DOSE) 0.7 milliLiter(s) IntraMuscular once    IVF:      Vital Signs Last 24 Hrs  T(C): 36.4 (07 Dec 2023 11:55), Max: 36.8 (07 Dec 2023 00:30)  T(F): 97.6 (07 Dec 2023 11:55), Max: 98.2 (07 Dec 2023 00:30)  HR: 61 (07 Dec 2023 11:55) (58 - 78)  BP: 142/81 (07 Dec 2023 11:55) (119/75 - 144/86)  BP(mean): --  RR: 18 (07 Dec 2023 11:55) (15 - 18)  SpO2: 100% (07 Dec 2023 11:55) (96% - 100%)    Parameters below as of 07 Dec 2023 11:55  Patient On (Oxygen Delivery Method): room air        PHYSICAL EXAM:  Constitutional: NAD, well groomed. Resting comfortably on stretcher.  Respiratory: No respiratory distress, symmetrical chest wall movement  Cardiovascuar: RRR.  Gastrointestinal: Abdomen soft, non tender  Genitourinary: exam deffered  Neurological:  AAOX3. Verbal function intact  Cranial Nerves: II-XII intact  Motor: 5/5 power in b/l UE and LE  Sensation: intact to light touch in all extremities  Pronator Drift: none  Dysmetria: gait not tested at this time    LABS:                        13.6   4.92  )-----------( 169      ( 07 Dec 2023 05:30 )             42.0     12-07    138  |  101  |  10  ----------------------------<  96  4.2   |  26  |  0.84    Ca    8.7      07 Dec 2023 05:30  Phos  3.3     12-07  Mg     2.1     12-07    TPro  7.6  /  Alb  3.6  /  TBili  0.4  /  DBili  x   /  AST  30  /  ALT  20  /  AlkPhos  86  12-06    PT/INR - ( 06 Dec 2023 18:11 )   PT: 10.8 sec;   INR: 0.98          PTT - ( 06 Dec 2023 18:11 )  PTT:28.4 sec  Urinalysis Basic - ( 07 Dec 2023 05:30 )    Color: x / Appearance: x / SG: x / pH: x  Gluc: 96 mg/dL / Ketone: x  / Bili: x / Urobili: x   Blood: x / Protein: x / Nitrite: x   Leuk Esterase: x / RBC: x / WBC x   Sq Epi: x / Non Sq Epi: x / Bacteria: x      CULTURES:    RADIOLOGY & ADDITIONAL STUDIES:  < from: CT Angio Brain Stroke Protocol  w/ IV Cont (12.06.23 @ 18:40) >  FINDINGS: The CTA examination demonstrates a approximately 1.7 mm   outpouching involving the left supraclinoid ICA which projects   posteriorly (series 4 image 393 and series 602 image 41). This may   represent an aneurysm. The internal carotid arteries are otherwise normal   in caliber. There is a normal bifurcation into the A1 and M1 segments.   The left A1 and A1 A2 segments are mildly underdeveloped. Thereis a   fenestration within the right A2 segment. There is a normal MCA   bifurcation. The vertebral arteries are normal in caliber. The basilar   artery is normal in caliber. There is a normal bifurcation into the   posterior cerebral arteries.    IMPRESSION: No large vessel occlusion. 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    < end of copied text >      Assessment:  78 year old female PMH POTS , orthostatic hypotension, long COVID presenting for unsteady gait and dizziness. Found to have 1.7mm supraclinoid aneurysm.    Plan:    D/w Dr. Grajeda     HISTORY OF PRESENT ILLNESS:   78 year old female, PMH long covid, POTS, orthostatic hypotension (under care of [facility] neurologist and cardiologist) presented to [facility] last night for dizziness and gait disturbance. Pt went to walk in yesterday for some cold-like symptoms and was noted to have a wide and unsteady gait accompanied by some dizziness. She was encouraged to go to ED by walk-in provider. CTA performed at [facility] showed 1.7mm L supraclinoid aneurysm. Also of note, pt reports some mild urinary incontinence which started recently, was diagnosed with UTI yesterday and started on Bactrim.      PAST MEDICAL & SURGICAL HISTORY:  Sleep apnea  UTI (urinary tract infection)  Pancreatitis  Status post appendectomy  H/O foot surgery    FAMILY HISTORY:    SOCIAL HISTORY:  Tobacco Use: unknown  EtOH use:   Substance:    Allergies  No Known Allergies  Intolerances:  Demerol HCl (Other (Mild))  Benadryl (Other (Mild))      REVIEW OF SYSTEMS    General: no recent illnesses, no recent wt gain/loss, no chills  Skin/Breast: no rash, lumps, new moles, erythema, tenderness  Ophthalmologic: no change in vision, diplopia, pain, redness, tearing, dry eyes	  ENMT: no hearing loss, tinnitus, ear pain, vertigo, nasal congestion, epistaxis, sore throat  Respiratory and Thorax: no coughing, wheezing, recent URI, shortness of breath	  Cardiovascular: no chest pain, MIX, leg swelling, irregular rhythm   Gastrointestinal:	no nausea, vomiting, diarrhea, abd pain, bloody stool, heartburn  Genitourinary: + incontinence. no frequency, dysuria, hematuria  Musculoskeletal:	no joint pain, no joint swelling, no tenderness  Neurological: see HPI  Psychiatric: no confusion, no anxiousness, no depression   Hematology/Lymphatics:	no brusing, easy bleeding, LAD  Endocrine: no excess urination/thirst, heat/cold intolerance  Allergic/Immunologic: no urticaria, sneezing, recurrent infections      MEDICATIONS:  Antibiotics:    Neuro:    Anticoagulation:  aspirin enteric coated 81 milliGRAM(s) Oral daily  enoxaparin Injectable 40 milliGRAM(s) SubCutaneous every 24 hours    OTHER:  atorvastatin 20 milliGRAM(s) Oral at bedtime  influenza  Vaccine (HIGH DOSE) 0.7 milliLiter(s) IntraMuscular once    IVF:      Vital Signs Last 24 Hrs  T(C): 36.4 (07 Dec 2023 11:55), Max: 36.8 (07 Dec 2023 00:30)  T(F): 97.6 (07 Dec 2023 11:55), Max: 98.2 (07 Dec 2023 00:30)  HR: 61 (07 Dec 2023 11:55) (58 - 78)  BP: 142/81 (07 Dec 2023 11:55) (119/75 - 144/86)  BP(mean): --  RR: 18 (07 Dec 2023 11:55) (15 - 18)  SpO2: 100% (07 Dec 2023 11:55) (96% - 100%)    Parameters below as of 07 Dec 2023 11:55  Patient On (Oxygen Delivery Method): room air        PHYSICAL EXAM:  Constitutional: NAD, well groomed. Resting comfortably on stretcher.  Respiratory: No respiratory distress, symmetrical chest wall movement  Cardiovascuar: RRR.  Gastrointestinal: Abdomen soft, non tender  Genitourinary: exam deffered  Neurological:  AAOX3. Verbal function intact  Cranial Nerves: II-XII intact  Motor: 5/5 power in b/l UE and LE  Sensation: intact to light touch in all extremities  Pronator Drift: none  Dysmetria: gait not tested at this time    LABS:                        13.6   4.92  )-----------( 169      ( 07 Dec 2023 05:30 )             42.0     12-07    138  |  101  |  10  ----------------------------<  96  4.2   |  26  |  0.84    Ca    8.7      07 Dec 2023 05:30  Phos  3.3     12-07  Mg     2.1     12-07    TPro  7.6  /  Alb  3.6  /  TBili  0.4  /  DBili  x   /  AST  30  /  ALT  20  /  AlkPhos  86  12-06    PT/INR - ( 06 Dec 2023 18:11 )   PT: 10.8 sec;   INR: 0.98          PTT - ( 06 Dec 2023 18:11 )  PTT:28.4 sec  Urinalysis Basic - ( 07 Dec 2023 05:30 )    Color: x / Appearance: x / SG: x / pH: x  Gluc: 96 mg/dL / Ketone: x  / Bili: x / Urobili: x   Blood: x / Protein: x / Nitrite: x   Leuk Esterase: x / RBC: x / WBC x   Sq Epi: x / Non Sq Epi: x / Bacteria: x      CULTURES:    RADIOLOGY & ADDITIONAL STUDIES:  < from: CT Angio Brain Stroke Protocol  w/ IV Cont (12.06.23 @ 18:40) >  FINDINGS: The CTA examination demonstrates a approximately 1.7 mm   outpouching involving the left supraclinoid ICA which projects   posteriorly (series 4 image 393 and series 602 image 41). This may   represent an aneurysm. The internal carotid arteries are otherwise normal   in caliber. There is a normal bifurcation into the A1 and M1 segments.   The left A1 and A1 A2 segments are mildly underdeveloped. Thereis a   fenestration within the right A2 segment. There is a normal MCA   bifurcation. The vertebral arteries are normal in caliber. The basilar   artery is normal in caliber. There is a normal bifurcation into the   posterior cerebral arteries.    IMPRESSION: No large vessel occlusion. 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    < end of copied text >      Assessment:  78 year old female PMH POTS , orthostatic hypotension, long COVID presenting for unsteady gait and dizziness. Found to have 1.7mm supraclinoid aneurysm.    Plan:    D/w Dr. Grajeda     HISTORY OF PRESENT ILLNESS:   78 year old female, PMH long covid, POTS, orthostatic hypotension (under care of [facility] neurologist and cardiologist) presented to [facility] last night for dizziness and gait disturbance. Pt went to walk in yesterday for some cold-like symptoms and was noted to have a wide and unsteady gait accompanied by some dizziness. She was encouraged to go to ED by walk-in provider. CTA performed at [facility] showed 1.7mm L supraclinoid aneurysm and patient was admitted to stroke neuro. Also of note, pt reports some mild urinary incontinence which started recently, was diagnosed with UTI yesterday and started on Bactrim.      PAST MEDICAL & SURGICAL HISTORY:  Sleep apnea  UTI (urinary tract infection)  Pancreatitis  Status post appendectomy  H/O foot surgery    FAMILY HISTORY:    SOCIAL HISTORY:  Tobacco Use: unknown  EtOH use:   Substance:    Allergies  No Known Allergies  Intolerances:  Demerol HCl (Other (Mild))  Benadryl (Other (Mild))      REVIEW OF SYSTEMS    General: no recent illnesses, no recent wt gain/loss, no chills  Skin/Breast: no rash, lumps, new moles, erythema, tenderness  Ophthalmologic: no change in vision, diplopia, pain, redness, tearing, dry eyes	  ENMT: no hearing loss, tinnitus, ear pain, vertigo, nasal congestion, epistaxis, sore throat  Respiratory and Thorax: no coughing, wheezing, recent URI, shortness of breath	  Cardiovascular: no chest pain, MIX, leg swelling, irregular rhythm   Gastrointestinal:	no nausea, vomiting, diarrhea, abd pain, bloody stool, heartburn  Genitourinary: + incontinence. no frequency, dysuria, hematuria  Musculoskeletal:	no joint pain, no joint swelling, no tenderness  Neurological: see HPI  Psychiatric: no confusion, no anxiousness, no depression   Hematology/Lymphatics:	no brusing, easy bleeding, LAD  Endocrine: no excess urination/thirst, heat/cold intolerance  Allergic/Immunologic: no urticaria, sneezing, recurrent infections      MEDICATIONS:  Antibiotics:    Neuro:    Anticoagulation:  aspirin enteric coated 81 milliGRAM(s) Oral daily  enoxaparin Injectable 40 milliGRAM(s) SubCutaneous every 24 hours    OTHER:  atorvastatin 20 milliGRAM(s) Oral at bedtime  influenza  Vaccine (HIGH DOSE) 0.7 milliLiter(s) IntraMuscular once    IVF:      Vital Signs Last 24 Hrs  T(C): 36.4 (07 Dec 2023 11:55), Max: 36.8 (07 Dec 2023 00:30)  T(F): 97.6 (07 Dec 2023 11:55), Max: 98.2 (07 Dec 2023 00:30)  HR: 61 (07 Dec 2023 11:55) (58 - 78)  BP: 142/81 (07 Dec 2023 11:55) (119/75 - 144/86)  BP(mean): --  RR: 18 (07 Dec 2023 11:55) (15 - 18)  SpO2: 100% (07 Dec 2023 11:55) (96% - 100%)    Parameters below as of 07 Dec 2023 11:55  Patient On (Oxygen Delivery Method): room air        PHYSICAL EXAM:  Constitutional: NAD, well groomed. Resting comfortably on stretcher.  Respiratory: No respiratory distress, symmetrical chest wall movement  Cardiovascuar: RRR.  Gastrointestinal: Abdomen soft, non tender  Genitourinary: exam deffered  Neurological:  AAOX3. Verbal function intact  Cranial Nerves: II-XII intact  Motor: 5/5 power in b/l UE and LE  Sensation: intact to light touch in all extremities  Pronator Drift: none  Dysmetria: gait not tested at this time    LABS:                        13.6   4.92  )-----------( 169      ( 07 Dec 2023 05:30 )             42.0     12-07    138  |  101  |  10  ----------------------------<  96  4.2   |  26  |  0.84    Ca    8.7      07 Dec 2023 05:30  Phos  3.3     12-07  Mg     2.1     12-07    TPro  7.6  /  Alb  3.6  /  TBili  0.4  /  DBili  x   /  AST  30  /  ALT  20  /  AlkPhos  86  12-06    PT/INR - ( 06 Dec 2023 18:11 )   PT: 10.8 sec;   INR: 0.98          PTT - ( 06 Dec 2023 18:11 )  PTT:28.4 sec  Urinalysis Basic - ( 07 Dec 2023 05:30 )    Color: x / Appearance: x / SG: x / pH: x  Gluc: 96 mg/dL / Ketone: x  / Bili: x / Urobili: x   Blood: x / Protein: x / Nitrite: x   Leuk Esterase: x / RBC: x / WBC x   Sq Epi: x / Non Sq Epi: x / Bacteria: x      CULTURES:    RADIOLOGY & ADDITIONAL STUDIES:  < from: CT Angio Brain Stroke Protocol  w/ IV Cont (12.06.23 @ 18:40) >  FINDINGS: The CTA examination demonstrates a approximately 1.7 mm   outpouching involving the left supraclinoid ICA which projects   posteriorly (series 4 image 393 and series 602 image 41). This may   represent an aneurysm. The internal carotid arteries are otherwise normal   in caliber. There is a normal bifurcation into the A1 and M1 segments.   The left A1 and A1 A2 segments are mildly underdeveloped. Thereis a   fenestration within the right A2 segment. There is a normal MCA   bifurcation. The vertebral arteries are normal in caliber. The basilar   artery is normal in caliber. There is a normal bifurcation into the   posterior cerebral arteries.    IMPRESSION: No large vessel occlusion. 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    < end of copied text >      Assessment:  78 year old female PMH POTS , orthostatic hypotension, long COVID presenting for unsteady gait and dizziness. Found to have 1.7mm supraclinoid aneurysm.    Plan:  - follow up in office with Dr. Tay 12/22/23 at 12:00pm  - no acute neurosurgical intervention at this time     D/w Dr. Tay     HISTORY OF PRESENT ILLNESS:   78 year old female, PMH long covid, POTS, orthostatic hypotension (under care of [facility] neurologist and cardiologist) presented to [facility] last night for dizziness and gait disturbance. Pt went to walk in yesterday for some cold-like symptoms and was noted to have a wide and unsteady gait accompanied by some dizziness. She was encouraged to go to ED by walk-in provider. CTA performed at [facility] showed 1.7mm L supraclinoid aneurysm and patient was admitted to stroke neuro. Also of note, pt reports some mild urinary incontinence which started recently, was diagnosed with UTI yesterday and started on Bactrim.      PAST MEDICAL & SURGICAL HISTORY:  Sleep apnea  UTI (urinary tract infection)  Pancreatitis  Status post appendectomy  H/O foot surgery    FAMILY HISTORY:    SOCIAL HISTORY:  Tobacco Use: unknown  EtOH use:   Substance:    Allergies  No Known Allergies  Intolerances:  Demerol HCl (Other (Mild))  Benadryl (Other (Mild))      REVIEW OF SYSTEMS    General: no recent illnesses, no recent wt gain/loss, no chills  Skin/Breast: no rash, lumps, new moles, erythema, tenderness  Ophthalmologic: no change in vision, diplopia, pain, redness, tearing, dry eyes	  ENMT: no hearing loss, tinnitus, ear pain, vertigo, nasal congestion, epistaxis, sore throat  Respiratory and Thorax: no coughing, wheezing, recent URI, shortness of breath	  Cardiovascular: no chest pain, MIX, leg swelling, irregular rhythm   Gastrointestinal:	no nausea, vomiting, diarrhea, abd pain, bloody stool, heartburn  Genitourinary: + incontinence. no frequency, dysuria, hematuria  Musculoskeletal:	no joint pain, no joint swelling, no tenderness  Neurological: see HPI  Psychiatric: no confusion, no anxiousness, no depression   Hematology/Lymphatics:	no brusing, easy bleeding, LAD  Endocrine: no excess urination/thirst, heat/cold intolerance  Allergic/Immunologic: no urticaria, sneezing, recurrent infections      MEDICATIONS:  Antibiotics:    Neuro:    Anticoagulation:  aspirin enteric coated 81 milliGRAM(s) Oral daily  enoxaparin Injectable 40 milliGRAM(s) SubCutaneous every 24 hours    OTHER:  atorvastatin 20 milliGRAM(s) Oral at bedtime  influenza  Vaccine (HIGH DOSE) 0.7 milliLiter(s) IntraMuscular once    IVF:      Vital Signs Last 24 Hrs  T(C): 36.4 (07 Dec 2023 11:55), Max: 36.8 (07 Dec 2023 00:30)  T(F): 97.6 (07 Dec 2023 11:55), Max: 98.2 (07 Dec 2023 00:30)  HR: 61 (07 Dec 2023 11:55) (58 - 78)  BP: 142/81 (07 Dec 2023 11:55) (119/75 - 144/86)  BP(mean): --  RR: 18 (07 Dec 2023 11:55) (15 - 18)  SpO2: 100% (07 Dec 2023 11:55) (96% - 100%)    Parameters below as of 07 Dec 2023 11:55  Patient On (Oxygen Delivery Method): room air        PHYSICAL EXAM:  Constitutional: NAD, well groomed. Resting comfortably on stretcher.  Respiratory: No respiratory distress, symmetrical chest wall movement  Cardiovascuar: RRR.  Gastrointestinal: Abdomen soft, non tender  Genitourinary: exam deffered  Neurological:  AAOX3. Verbal function intact  Cranial Nerves: II-XII intact  Motor: 5/5 power in b/l UE and LE  Sensation: intact to light touch in all extremities  Pronator Drift: none  Dysmetria: gait not tested at this time    LABS:                        13.6   4.92  )-----------( 169      ( 07 Dec 2023 05:30 )             42.0     12-07    138  |  101  |  10  ----------------------------<  96  4.2   |  26  |  0.84    Ca    8.7      07 Dec 2023 05:30  Phos  3.3     12-07  Mg     2.1     12-07    TPro  7.6  /  Alb  3.6  /  TBili  0.4  /  DBili  x   /  AST  30  /  ALT  20  /  AlkPhos  86  12-06    PT/INR - ( 06 Dec 2023 18:11 )   PT: 10.8 sec;   INR: 0.98          PTT - ( 06 Dec 2023 18:11 )  PTT:28.4 sec  Urinalysis Basic - ( 07 Dec 2023 05:30 )    Color: x / Appearance: x / SG: x / pH: x  Gluc: 96 mg/dL / Ketone: x  / Bili: x / Urobili: x   Blood: x / Protein: x / Nitrite: x   Leuk Esterase: x / RBC: x / WBC x   Sq Epi: x / Non Sq Epi: x / Bacteria: x      CULTURES:    RADIOLOGY & ADDITIONAL STUDIES:  < from: CT Angio Brain Stroke Protocol  w/ IV Cont (12.06.23 @ 18:40) >  FINDINGS: The CTA examination demonstrates a approximately 1.7 mm   outpouching involving the left supraclinoid ICA which projects   posteriorly (series 4 image 393 and series 602 image 41). This may   represent an aneurysm. The internal carotid arteries are otherwise normal   in caliber. There is a normal bifurcation into the A1 and M1 segments.   The left A1 and A1 A2 segments are mildly underdeveloped. Thereis a   fenestration within the right A2 segment. There is a normal MCA   bifurcation. The vertebral arteries are normal in caliber. The basilar   artery is normal in caliber. There is a normal bifurcation into the   posterior cerebral arteries.    IMPRESSION: No large vessel occlusion. 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    < end of copied text >      Assessment:  78 year old female PMH POTS , orthostatic hypotension, long COVID presenting for unsteady gait and dizziness. Found to have 1.7mm supraclinoid aneurysm.    Plan:  - follow up in office with Dr. Tay 12/22/23 at 12:00pm  - no acute neurosurgical intervention at this time     D/w Dr. Tay

## 2023-12-07 NOTE — H&P ADULT - HISTORY OF PRESENT ILLNESS
**STROKE HPI***    HPI: 78y Female with PMHx of     PAST MEDICAL & SURGICAL HISTORY:  Sleep apnea      UTI (urinary tract infection)      Pancreatitis      Status post appendectomy      H/O foot surgery          FAMILY HISTORY:            T(C): 36.4 (12-07-23 @ 04:09), Max: 36.8 (12-07-23 @ 00:30)  HR: 60 (12-07-23 @ 04:09) (60 - 78)  BP: 122/65 (12-07-23 @ 04:09) (119/75 - 144/86)  RR: 18 (12-07-23 @ 04:09) (15 - 18)  SpO2: 96% (12-07-23 @ 04:09) (96% - 97%)    MEDICATION RECONCILIATION   MEDICATIONS  (STANDING):  enoxaparin Injectable 40 milliGRAM(s) SubCutaneous every 24 hours  influenza  Vaccine (HIGH DOSE) 0.7 milliLiter(s) IntraMuscular once    MEDICATIONS  (PRN):    Allergies    No Known Allergies    Intolerances    Demerol HCl (Other (Mild))  Benadryl (Other (Mild))    Vital Signs Last 24 Hrs  T(C): 36.4 (07 Dec 2023 04:09), Max: 36.8 (07 Dec 2023 00:30)  T(F): 97.5 (07 Dec 2023 04:09), Max: 98.2 (07 Dec 2023 00:30)  HR: 60 (07 Dec 2023 04:09) (60 - 78)  BP: 122/65 (07 Dec 2023 04:09) (119/75 - 144/86)  BP(mean): --  RR: 18 (07 Dec 2023 04:09) (15 - 18)  SpO2: 96% (07 Dec 2023 04:09) (96% - 97%)    Parameters below as of 07 Dec 2023 04:09  Patient On (Oxygen Delivery Method): room air        **STROKE HPI***    HPI: 78y Female with PMHx of long COVID following 4th Moderna vaccine, who initially presented to [facility] with worsening dizziness and unsteady gait. Patient reports for the past 1 month she has been experiencing increased dizziness and feeling unsteady while walking. She underwent a brain MRI 1 month ago which was "normal" per patient. She was walking yesterday evening in the [facility] when she became very dizzy and felt as though "she was taking up the whole sidewalk while walking". She denies a room spinning sensation. She also endorses occasional ?binocular diplopia, that she is able to "get rid of her own". On evaluation at [facility], she states the doctor said she was leaning to the left and had a wide based gait. Imaging performed at [facility] negative except for a 1.7cm L supraclinoid ICA seen on CTA. Of note, patient was diagnosed with a UTI yesterday and is currently receiving Bactrim and also reports she has been gradually increased her naltrexone dose. She denies any headaches or nausea.     PAST MEDICAL & SURGICAL HISTORY:  Sleep apnea      UTI (urinary tract infection)      Pancreatitis      Status post appendectomy      H/O foot surgery          FAMILY HISTORY:            T(C): 36.4 (12-07-23 @ 04:09), Max: 36.8 (12-07-23 @ 00:30)  HR: 60 (12-07-23 @ 04:09) (60 - 78)  BP: 122/65 (12-07-23 @ 04:09) (119/75 - 144/86)  RR: 18 (12-07-23 @ 04:09) (15 - 18)  SpO2: 96% (12-07-23 @ 04:09) (96% - 97%)    MEDICATION RECONCILIATION   MEDICATIONS  (STANDING):  enoxaparin Injectable 40 milliGRAM(s) SubCutaneous every 24 hours  influenza  Vaccine (HIGH DOSE) 0.7 milliLiter(s) IntraMuscular once    MEDICATIONS  (PRN):    Allergies    No Known Allergies    Intolerances    Demerol HCl (Other (Mild))  Benadryl (Other (Mild))    Vital Signs Last 24 Hrs  T(C): 36.4 (07 Dec 2023 04:09), Max: 36.8 (07 Dec 2023 00:30)  T(F): 97.5 (07 Dec 2023 04:09), Max: 98.2 (07 Dec 2023 00:30)  HR: 60 (07 Dec 2023 04:09) (60 - 78)  BP: 122/65 (07 Dec 2023 04:09) (119/75 - 144/86)  BP(mean): --  RR: 18 (07 Dec 2023 04:09) (15 - 18)  SpO2: 96% (07 Dec 2023 04:09) (96% - 97%)    Parameters below as of 07 Dec 2023 04:09  Patient On (Oxygen Delivery Method): room air        **STROKE HPI***    HPI: 78y Female with PMHx of long COVID following 4th Moderna vaccine, who initially presented to [facility] with worsening dizziness and unsteady gait. Patient reports for the past 1 month she has been experiencing increased dizziness and feeling unsteady while walking. She underwent a brain MRI 1 month ago which was "normal" per patient. She was walking yesterday evening in the [facility] when she became very dizzy and felt as though "she was taking up the whole sidewalk while walking". She denies a room spinning sensation. She also endorses occasional ?binocular diplopia, that she is able to "get rid of her own". On evaluation at [facility], she states the doctor said she was leaning to the left and had a wide based gait. Imaging performed at [facility] negative except for a 1.7cm L supraclinoid ICA seen on CTA. Of note, patient was diagnosed with a UTI yesterday and is currently receiving Bactrim and also reports she has been gradually increased her naltrexone dose. She denies any headaches or nausea.     PAST MEDICAL & SURGICAL HISTORY:  Sleep apnea      UTI (urinary tract infection)      Pancreatitis      Status post appendectomy      H/O foot surgery          FAMILY HISTORY:            T(C): 36.4 (12-07-23 @ 04:09), Max: 36.8 (12-07-23 @ 00:30)  HR: 60 (12-07-23 @ 04:09) (60 - 78)  BP: 122/65 (12-07-23 @ 04:09) (119/75 - 144/86)  RR: 18 (12-07-23 @ 04:09) (15 - 18)  SpO2: 96% (12-07-23 @ 04:09) (96% - 97%)    MEDICATION RECONCILIATION   MEDICATIONS  (STANDING):  enoxaparin Injectable 40 milliGRAM(s) SubCutaneous every 24 hours  influenza  Vaccine (HIGH DOSE) 0.7 milliLiter(s) IntraMuscular once    MEDICATIONS  (PRN):    Allergies    No Known Allergies    Intolerances    Demerol HCl (Other (Mild))  Benadryl (Other (Mild))    Vital Signs Last 24 Hrs  T(C): 36.4 (07 Dec 2023 04:09), Max: 36.8 (07 Dec 2023 00:30)  T(F): 97.5 (07 Dec 2023 04:09), Max: 98.2 (07 Dec 2023 00:30)  HR: 60 (07 Dec 2023 04:09) (60 - 78)  BP: 122/65 (07 Dec 2023 04:09) (119/75 - 144/86)  BP(mean): --  RR: 18 (07 Dec 2023 04:09) (15 - 18)  SpO2: 96% (07 Dec 2023 04:09) (96% - 97%)    Parameters below as of 07 Dec 2023 04:09  Patient On (Oxygen Delivery Method): room air        **STROKE HPI***    HPI: 78y Female with PMHx of long COVID following 4th Moderna vaccine, who initially presented to [facility] with worsening dizziness and unsteady gait. Patient reports for the past 1 month she has been experiencing increased dizziness and feeling unsteady while walking. She underwent a brain MRI 1 month ago which was "normal" per patient. She was walking yesterday evening in the [facility] when she became very dizzy and felt as though "she was taking up the whole sidewalk while walking". She denies a room spinning sensation. She also endorses occasional ?binocular diplopia, that she is able to "get rid of her own". On evaluation at [facility], she states the doctor said she was leaning to the left and had a wide based gait. Imaging performed at [facility] negative except for a 1.7cm L supraclinoid ICA seen on CTA. Of note, patient was diagnosed with a UTI yesterday and is currently receiving Bactrim and also reports she has been gradually increasing her naltrexone dose. She denies any headaches or nausea.     PAST MEDICAL & SURGICAL HISTORY:  Sleep apnea      UTI (urinary tract infection)      Pancreatitis      Status post appendectomy      H/O foot surgery          FAMILY HISTORY:            T(C): 36.4 (12-07-23 @ 04:09), Max: 36.8 (12-07-23 @ 00:30)  HR: 60 (12-07-23 @ 04:09) (60 - 78)  BP: 122/65 (12-07-23 @ 04:09) (119/75 - 144/86)  RR: 18 (12-07-23 @ 04:09) (15 - 18)  SpO2: 96% (12-07-23 @ 04:09) (96% - 97%)    MEDICATION RECONCILIATION   MEDICATIONS  (STANDING):  enoxaparin Injectable 40 milliGRAM(s) SubCutaneous every 24 hours  influenza  Vaccine (HIGH DOSE) 0.7 milliLiter(s) IntraMuscular once    MEDICATIONS  (PRN):    Allergies    No Known Allergies    Intolerances    Demerol HCl (Other (Mild))  Benadryl (Other (Mild))    Vital Signs Last 24 Hrs  T(C): 36.4 (07 Dec 2023 04:09), Max: 36.8 (07 Dec 2023 00:30)  T(F): 97.5 (07 Dec 2023 04:09), Max: 98.2 (07 Dec 2023 00:30)  HR: 60 (07 Dec 2023 04:09) (60 - 78)  BP: 122/65 (07 Dec 2023 04:09) (119/75 - 144/86)  BP(mean): --  RR: 18 (07 Dec 2023 04:09) (15 - 18)  SpO2: 96% (07 Dec 2023 04:09) (96% - 97%)    Parameters below as of 07 Dec 2023 04:09  Patient On (Oxygen Delivery Method): room air        **STROKE HPI***    HPI: 78y Female with PMHx of long COVID following 4th Moderna vaccine, who initially presented to [facility] with worsening dizziness and unsteady gait. Patient reports for the past 1 month she has been experiencing increased dizziness and feeling unsteady while walking. She underwent a brain MRI 1 month ago which was "normal" per patient. She was walking yesterday evening in the [facility] when she became very dizzy and felt as though "she was taking up the whole sidewalk while walking". She denies a room spinning sensation. She also endorses occasional ?binocular diplopia, that she is able to "get rid of her own". On evaluation at [facility], she states the doctor said she was leaning to the left and had a wide based gait. Imaging performed at [facility] negative except for a 1.7cm L supraclinoid ICA seen on CTA. Of note, patient was diagnosed with a UTI yesterday and is currently receiving Bactrim and also reports she has been gradually increasing her naltrexone dose. She denies any headaches or nausea.     PAST MEDICAL & SURGICAL HISTORY:  Sleep apnea      UTI (urinary tract infection)      Pancreatitis      Status post appendectomy      H/O foot surgery          FAMILY HISTORY:            T(C): 36.4 (12-07-23 @ 04:09), Max: 36.8 (12-07-23 @ 00:30)  HR: 60 (12-07-23 @ 04:09) (60 - 78)  BP: 122/65 (12-07-23 @ 04:09) (119/75 - 144/86)  RR: 18 (12-07-23 @ 04:09) (15 - 18)  SpO2: 96% (12-07-23 @ 04:09) (96% - 97%)    MEDICATION RECONCILIATION   MEDICATIONS  (STANDING):  enoxaparin Injectable 40 milliGRAM(s) SubCutaneous every 24 hours  influenza  Vaccine (HIGH DOSE) 0.7 milliLiter(s) IntraMuscular once    MEDICATIONS  (PRN):    Allergies    No Known Allergies    Intolerances    Demerol HCl (Other (Mild))  Benadryl (Other (Mild))    Vital Signs Last 24 Hrs  T(C): 36.4 (07 Dec 2023 04:09), Max: 36.8 (07 Dec 2023 00:30)  T(F): 97.5 (07 Dec 2023 04:09), Max: 98.2 (07 Dec 2023 00:30)  HR: 60 (07 Dec 2023 04:09) (60 - 78)  BP: 122/65 (07 Dec 2023 04:09) (119/75 - 144/86)  BP(mean): --  RR: 18 (07 Dec 2023 04:09) (15 - 18)  SpO2: 96% (07 Dec 2023 04:09) (96% - 97%)    Parameters below as of 07 Dec 2023 04:09  Patient On (Oxygen Delivery Method): room air

## 2023-12-07 NOTE — PROGRESS NOTE ADULT - SUBJECTIVE AND OBJECTIVE BOX
Subjective/ROS: Patient seen and examined at bedside.     Denies Fever/Chills, HA, CP, SOB, n/v, changes in bowel/urinary habits.  12pt ROS otherwise negative.    VITALS  Vital Signs Last 24 Hrs  T(C): 36.4 (07 Dec 2023 11:55), Max: 36.8 (07 Dec 2023 00:30)  T(F): 97.6 (07 Dec 2023 11:55), Max: 98.2 (07 Dec 2023 00:30)  HR: 61 (07 Dec 2023 11:55) (58 - 78)  BP: 142/81 (07 Dec 2023 11:55) (119/75 - 144/86)  BP(mean): --  RR: 18 (07 Dec 2023 11:55) (15 - 18)  SpO2: 100% (07 Dec 2023 11:55) (96% - 100%)    Parameters below as of 07 Dec 2023 11:55  Patient On (Oxygen Delivery Method): room air        CAPILLARY BLOOD GLUCOSE      POCT Blood Glucose.: 91 mg/dL (06 Dec 2023 18:02)      PHYSICAL EXAM  General: NAD  Head: NC/AT; MMM; PERRL; EOMI;  Neck: Supple; no JVD  Respiratory: CTAB; no wheezes/rales/rhonchi  Cardiovascular: Regular rhythm/rate; S1/S2+, no murmurs, rubs gallops   Gastrointestinal: Soft; NTND; bowel sounds normal and present  Extremities: WWP; no edema/cyanosis  Neurological: A&Ox3, CNII-XII grossly intact; no obvious focal deficits    Antimicrobials:  MEDICATIONS  (STANDING):      Standing Medications:  MEDICATIONS  (STANDING):  aspirin enteric coated 81 milliGRAM(s) Oral daily  atorvastatin 20 milliGRAM(s) Oral at bedtime  enoxaparin Injectable 40 milliGRAM(s) SubCutaneous every 24 hours  influenza  Vaccine (HIGH DOSE) 0.7 milliLiter(s) IntraMuscular once      PRN Medications:  MEDICATIONS  (PRN):      Demerol HCl (Other (Mild))  Benadryl (Other (Mild))  No Known Allergies      LABS                        13.6   4.92  )-----------( 169      ( 07 Dec 2023 05:30 )             42.0     12-07    138  |  101  |  10  ----------------------------<  96  4.2   |  26  |  0.84    Ca    8.7      07 Dec 2023 05:30  Phos  3.3     12-07  Mg     2.1     12-07    TPro  7.6  /  Alb  3.6  /  TBili  0.4  /  DBili  x   /  AST  30  /  ALT  20  /  AlkPhos  86  12-06    PT/INR - ( 06 Dec 2023 18:11 )   PT: 10.8 sec;   INR: 0.98          PTT - ( 06 Dec 2023 18:11 )  PTT:28.4 sec  Urinalysis Basic - ( 07 Dec 2023 05:30 )    Color: x / Appearance: x / SG: x / pH: x  Gluc: 96 mg/dL / Ketone: x  / Bili: x / Urobili: x   Blood: x / Protein: x / Nitrite: x   Leuk Esterase: x / RBC: x / WBC x   Sq Epi: x / Non Sq Epi: x / Bacteria: x              IMAGING/EKG/ETC   Subjective/ROS: Patient seen and examined at bedside. Patient states that she feels fine and is wanting to go home for her meeting tonight.    Denies Fever/Chills, HA, CP, SOB, n/v, changes in bowel/urinary habits.  12pt ROS otherwise negative.    VITALS  Vital Signs Last 24 Hrs  T(C): 36.4 (07 Dec 2023 11:55), Max: 36.8 (07 Dec 2023 00:30)  T(F): 97.6 (07 Dec 2023 11:55), Max: 98.2 (07 Dec 2023 00:30)  HR: 61 (07 Dec 2023 11:55) (58 - 78)  BP: 142/81 (07 Dec 2023 11:55) (119/75 - 144/86)  BP(mean): --  RR: 18 (07 Dec 2023 11:55) (15 - 18)  SpO2: 100% (07 Dec 2023 11:55) (96% - 100%)    Parameters below as of 07 Dec 2023 11:55  Patient On (Oxygen Delivery Method): room air        CAPILLARY BLOOD GLUCOSE      POCT Blood Glucose.: 91 mg/dL (06 Dec 2023 18:02)      PHYSICAL EXAM  General: NAD  Head: NC/AT; MMM; EOMI;  Neck: Supple; no JVD  Respiratory: CTAB; no wheezes/rales/rhonchi  Cardiovascular: Regular rhythm/rate; S1/S2+, no murmurs, rubs gallops   Gastrointestinal: Soft; NTND;  Extremities: WWP; no edema/cyanosis  Neurological: A&Ox3, CNII-XII grossly intact; no obvious focal deficits    Antimicrobials:  MEDICATIONS  (STANDING):      Standing Medications:  MEDICATIONS  (STANDING):  aspirin enteric coated 81 milliGRAM(s) Oral daily  atorvastatin 20 milliGRAM(s) Oral at bedtime  enoxaparin Injectable 40 milliGRAM(s) SubCutaneous every 24 hours  influenza  Vaccine (HIGH DOSE) 0.7 milliLiter(s) IntraMuscular once      PRN Medications:  MEDICATIONS  (PRN):      Demerol HCl (Other (Mild))  Benadryl (Other (Mild))  No Known Allergies      LABS                        13.6   4.92  )-----------( 169      ( 07 Dec 2023 05:30 )             42.0     12-07    138  |  101  |  10  ----------------------------<  96  4.2   |  26  |  0.84    Ca    8.7      07 Dec 2023 05:30  Phos  3.3     12-07  Mg     2.1     12-07    TPro  7.6  /  Alb  3.6  /  TBili  0.4  /  DBili  x   /  AST  30  /  ALT  20  /  AlkPhos  86  12-06    PT/INR - ( 06 Dec 2023 18:11 )   PT: 10.8 sec;   INR: 0.98          PTT - ( 06 Dec 2023 18:11 )  PTT:28.4 sec  Urinalysis Basic - ( 07 Dec 2023 05:30 )    Color: x / Appearance: x / SG: x / pH: x  Gluc: 96 mg/dL / Ketone: x  / Bili: x / Urobili: x   Blood: x / Protein: x / Nitrite: x   Leuk Esterase: x / RBC: x / WBC x   Sq Epi: x / Non Sq Epi: x / Bacteria: x              IMAGING/EKG/ETC

## 2023-12-07 NOTE — DISCHARGE NOTE PROVIDER - NSDCMRMEDTOKEN_GEN_ALL_CORE_FT
aspirin 81 mg oral delayed release tablet: 1 tab(s) orally once a day  atorvastatin 20 mg oral tablet: 1 tab(s) orally once a day (at bedtime)   aspirin 81 mg oral delayed release tablet: 1 tab(s) orally once a day

## 2023-12-07 NOTE — DISCHARGE NOTE NURSING/CASE MANAGEMENT/SOCIAL WORK - NSDCVIVACCINE_GEN_ALL_CORE_FT
Tdap; 08-Feb-2022 21:14; Keena Witt (RINA); [employer]; [id number] (Exp. Date: 23-Sep-2023); IntraMuscular; Deltoid Left.; 0.5 milliLiter(s); VIS (VIS Published: 09-May-2013, VIS Presented: 08-Feb-2022);    Tdap; 08-Feb-2022 21:14; Keena Witt ([place]); [employer]; [id number] (Exp. Date: 23-Sep-2023); IntraMuscular; Deltoid Left.; 0.5 milliLiter(s); VIS (VIS Published: 09-May-2013, VIS Presented: 08-Feb-2022);

## 2023-12-07 NOTE — H&P ADULT - NSHPREVIEWOFSYSTEMS_GEN_ALL_CORE
ROS: ***  Constitutional: No fever, weight loss or fatigue  Eyes: No eye pain, visual disturbances, or discharge  ENMT:  No difficulty hearing, tinnitus, vertigo; No sinus or throat pain  Neck: No pain or stiffness  Respiratory: No cough, wheezing, chills or hemoptysis  Cardiovascular: No chest pain, palpitations, shortness of breath, dizziness or leg swelling  Gastrointestinal: No abdominal pain. No nausea, vomiting or hematemesis; No diarrhea or constipation. Nohematochezia.  Genitourinary: No dysuria, frequency, hematuria or incontinence  Neurological: As per HPI  Skin: No itching, burning, rashes or lesions   Endocrine: No heat or cold intolerance; No hair loss  Musculoskeletal: No joint pain or swelling; No muscle, back or extremity pain  Psychiatric: No depression, anxiety, mood swings or difficulty sleeping  Heme/Lymph: No easy bruising or bleeding gums ROS:   Constitutional: No fever, weight loss or fatigue  Eyes: No eye pain, visual disturbances, or discharge  ENMT:  No difficulty hearing, tinnitus, vertigo; No sinus or throat pain  Neck: No pain or stiffness  Respiratory: No cough, wheezing, chills or hemoptysis  Cardiovascular: No chest pain, palpitations, shortness of breath, dizziness or leg swelling  Gastrointestinal: No abdominal pain. No nausea, vomiting or hematemesis; No diarrhea or constipation.   Genitourinary: No dysuria, frequency, hematuria or incontinence  Neurological: As per HPI  Skin: No itching, burning, rashes or lesions   Endocrine: No heat or cold intolerance; No hair loss  Musculoskeletal: No joint pain or swelling; No muscle, back or extremity pain  Psychiatric: No depression, anxiety, mood swings or difficulty sleeping  Heme/Lymph: No easy bruising or bleeding gums

## 2023-12-07 NOTE — OCCUPATIONAL THERAPY INITIAL EVALUATION ADULT - DIAGNOSIS, OT EVAL
Pt presenting with mild balance impairment, however performing all ADL/functional mobility independently at this time.

## 2023-12-07 NOTE — PATIENT PROFILE ADULT - FALL HARM RISK - HARM RISK INTERVENTIONS
Assistance with ambulation/Assistance OOB with selected safe patient handling equipment/Communicate Risk of Fall with Harm to all staff/Discuss with provider need for PT consult/Monitor gait and stability/Reinforce activity limits and safety measures with patient and family/Sit up slowly, dangle for a short time, stand at bedside before walking/Tailored Fall Risk Interventions/Visual Cue: Yellow wristband and red socks/Bed in lowest position, wheels locked, appropriate side rails in place/Call bell, personal items and telephone in reach/Instruct patient to call for assistance before getting out of bed or chair/Non-slip footwear when patient is out of bed/[place] to call system/Physically safe environment - no spills, clutter or unnecessary equipment/Purposeful Proactive Rounding/Room/bathroom lighting operational, light cord in reach Assistance with ambulation/Assistance OOB with selected safe patient handling equipment/Communicate Risk of Fall with Harm to all staff/Discuss with provider need for PT consult/Monitor gait and stability/Reinforce activity limits and safety measures with patient and family/Sit up slowly, dangle for a short time, stand at bedside before walking/Tailored Fall Risk Interventions/Visual Cue: Yellow wristband and red socks/Bed in lowest position, wheels locked, appropriate side rails in place/Call bell, personal items and telephone in reach/Instruct patient to call for assistance before getting out of bed or chair/Non-slip footwear when patient is out of bed/[place] to call system/Physically safe environment - no spills, clutter or unnecessary equipment/Purposeful Proactive Rounding/Room/bathroom lighting operational, light cord in reach

## 2023-12-14 DIAGNOSIS — Z11.52 ENCOUNTER FOR SCREENING FOR COVID-19: ICD-10-CM

## 2023-12-14 DIAGNOSIS — I10 ESSENTIAL (PRIMARY) HYPERTENSION: ICD-10-CM

## 2023-12-14 DIAGNOSIS — G91.2 (IDIOPATHIC) NORMAL PRESSURE HYDROCEPHALUS: ICD-10-CM

## 2023-12-14 DIAGNOSIS — Z90.49 ACQUIRED ABSENCE OF OTHER SPECIFIED PARTS OF DIGESTIVE TRACT: ICD-10-CM

## 2023-12-14 DIAGNOSIS — U09.9 POST COVID-19 CONDITION, UNSPECIFIED: ICD-10-CM

## 2023-12-14 DIAGNOSIS — R42 DIZZINESS AND GIDDINESS: ICD-10-CM

## 2023-12-14 DIAGNOSIS — R26.9 UNSPECIFIED ABNORMALITIES OF GAIT AND MOBILITY: ICD-10-CM

## 2023-12-14 DIAGNOSIS — E78.5 HYPERLIPIDEMIA, UNSPECIFIED: ICD-10-CM

## 2023-12-14 DIAGNOSIS — I67.1 CEREBRAL ANEURYSM, NONRUPTURED: ICD-10-CM

## 2023-12-14 DIAGNOSIS — R26.81 UNSTEADINESS ON FEET: ICD-10-CM

## 2023-12-14 DIAGNOSIS — N39.0 URINARY TRACT INFECTION, SITE NOT SPECIFIED: ICD-10-CM

## 2023-12-18 ENCOUNTER — APPOINTMENT (OUTPATIENT)
Dept: NEUROSURGERY | Facility: CLINIC | Age: 78
End: 2023-12-18
Payer: MEDICARE

## 2023-12-18 VITALS
OXYGEN SATURATION: 98 % | HEIGHT: 64 IN | HEART RATE: 56 BPM | WEIGHT: 125 LBS | DIASTOLIC BLOOD PRESSURE: 80 MMHG | BODY MASS INDEX: 21.34 KG/M2 | TEMPERATURE: 97.8 F | RESPIRATION RATE: 18 BRPM | SYSTOLIC BLOOD PRESSURE: 147 MMHG

## 2023-12-18 DIAGNOSIS — G93.89 OTHER SPECIFIED DISORDERS OF BRAIN: ICD-10-CM

## 2023-12-18 DIAGNOSIS — I67.1 CEREBRAL ANEURYSM, NONRUPTURED: ICD-10-CM

## 2023-12-18 PROCEDURE — 99204 OFFICE O/P NEW MOD 45 MIN: CPT

## 2023-12-20 PROBLEM — G93.89 CEREBRAL VENTRICULOMEGALY: Status: ACTIVE | Noted: 2023-12-20

## 2023-12-20 PROBLEM — I67.1 CEREBRAL ANEURYSM: Status: ACTIVE | Noted: 2023-12-20

## 2023-12-20 NOTE — DATA REVIEWED
[de-identified] : ACC: [id number] EXAM: CT ANGIO NECK STROKE PROTCL IC ORDERED BY: SALIMA ACSAS  ACC: [id number] EXAM: CT ANGIO BRAIN STROKE PROTC IC ORDERED BY: SALIMA CASAS  PROCEDURE DATE: 12/06/2023    INTERPRETATION: PROCEDURE: CTA brain with and without intravenous contrast.  INDICATION: Dizziness; gait disturbance  TECHNIQUE: Multiple thin section axial images were obtained through the [place] of [place] following the intravenous injection of 80 ml of Omnipaque 350. Multiple 3-D reformatted images were generated from the axial cuts.  COMPARISON: None  FINDINGS: The CTA examination demonstrates a approximately 1.7 mm outpouching involving the left supraclinoid ICA which projects posteriorly (series 4 image 393 and series 602 image 41). This may represent an aneurysm. The internal carotid arteries are otherwise normal in caliber. There is a normal bifurcation into the A1 and M1 segments. The left A1 and A1 A2 segments are mildly underdeveloped. There is a fenestration within the right A2 segment. There is a normal MCA bifurcation. The vertebral arteries are normal in caliber. The basilar artery is normal in caliber. There is a normal bifurcation into the posterior cerebral arteries.  IMPRESSION: No large vessel occlusion. 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   PROCEDURE: CTA neck with and without intravenous contrast.  INDICATION: Dizziness; gait disturbance  TECHNIQUE: Multiple axial thin section were obtained through the neck following the intravenous injection of 80 ml of Omnipaque 350. Multiple 3-D reformatted images were generated from the axial cuts.  COMPARISON: None  FINDINGS: The CTA examination demonstrates the right common carotid artery to be normal in caliber. There is a normal bifurcation into the right internal and external carotid arteries. There is no hemodynamically significant stenosis.  The left common carotid artery is normal in caliber. There is a normal bifurcation into the left internal and external carotid arteries. There is no hemodynamically significant stenosis.  The vertebral arteries are normal in caliber.  The aortic arch appears intact without narrowing of the origin of the great vessels.  Evaluation of the cervical spine demonstrates Grade 1 anterolisthesis of C2 on C3. There is cervical spondylosis at the C3/4 through the C6/7 levels.  IMPRESSION: Normal CTA of the neck.  --- End of Report ---      CHLOE RODRIGUEZ MD; Attending Radiologist This document has been electronically signed. Dec 6 2023 7:11PM [de-identified] :  Patient ID:[id number] YOB: 1945 (78Y) Gender:F	 Patient History Filter exams by  Last Month Last Year Last 3 Years All (7) (11) (18) (40) All exams (40)   12/7/2023 1:39 PM MR HEAD MR BRAIN 12/7/2023 4:45 AM ECHO ECHO TTE WO CON COMP W DOPP 12/6/2023 6:39 PM CT HEAD CT ANGIO NECK STROKE PROTOCOL 12/6/2023 6:32 PM CR CHEST XR CHEST 12/6/2023 6:21 PM CT HEAD CT ANGIO HEAD STROKE PROTOCOL 12/6/2023 6:21 PM CT HEAD CT PERFUSION WITH MAPS STROKE PROTOCOL 12/6/2023 6:10 PM CT HEAD CT HEAD STROKE PROTOCOL 10/14/2023 1:20 PM MR HEAD MR BRAIN 10/13/2023 1:31 PM CR CHEST XR CHEST PA AND LATERAL 3/16/2023 10:38 AM CR WHOLEBODY BMD BONE DENSITY AXIAL 1/22/2023 9:07 PM CT CHEST CT CHEST 3/3/2022 5:18 PM DX CR KNEE XR KNEE 3 VIEWS LEFT 3/3/2022 5:10 PM CT HEAD CT HEAD 2/8/2022 9:29 PM CT HEAD CT HEAD 2/8/2022 9:03 PM CT CERVICALSPINE CT CERVICAL SPINE 2/8/2022 8:58 PM CT HEAD CT MAXILLOFACIAL 2/8/2022 8:52 PM CR WRIST XR WRIST COMPLETE 3 VIEWS LEFT 2/8/2022 8:51 PM CR HAND XR HAND 3 VIEWS LEFT 2/19/2020 9:23 AM CR FOOT XR FOOT COMPLETE 3 VIEWS RIGHT 9/27/2019 11:15 AM CR KNEE XR KNEE COMPLETE 4 VIEWS LEFT 7/31/2019 2:47 PM CR FOOT XR FOOT COMPLETE 3 VIEWS RIGHT 6/26/2019 2:52 PM CR FOOT XR FOOT COMPLETE 3 VIEWS RIGHT 6/5/2019 11:06 AM CR ANKLE XR ANKLE COMPLETE 3 VIEWS BILATERAL 6/5/2019 11:05 AM CR KNEE XR KNEE COMPLETE 4 VIEWS LEFT 6/5/2019 11:05 AM CR FOOT XR FOOT COMPLETE 3 VIEWS BILATERAL 5/21/2019 5:33 PM CR FOOT XR FOOT COMPLETE 3 VIEWS RIGHT 5/10/2019 5:45 PM DX FOOT XR RIGHT FOOT 4/26/2019 9:27 AM MR HEAD MR BRAIN 4/16/2019 1:36 PM CR LUMBARSPINE XR LUMBAR SPINE AP AND LATERAL 2 OR 3 VIEWS 4/16/2019 1:34 PM CR CERVICALSPINE XR CERVICAL SPINE AP AND LATERAL 2 OR 3 VIEWS 4/16/2019 1:31 PM CR KNEE XR KNEE AP AND LATERAL 1 OR 2 VIEWS LEFT 4/16/2019 12:38 PM US NECK US THYROID PARATHYROID 4/16/2019 12:17 PM CT CHEST CT ANGIO CARDIAC CORONARY WITHOUT CALCIUM WITH IV CONTRAST 4/5/2019 11:44 AM CR WHOLEBODY BMD BONE DENSITY AXIAL 1/28/2018 9:40 PM ABDOMEN CT ABDOMEN AND PELVIS WITH ORAL CONTRAST WITH IV CONTRAST 1/10/2018 10:28 PM OT CT ABDOMEN CT RENAL STONE HUNT 1/10/2018 10:23 PM OT CR CHEST XR CHEST PA AND LATERAL 11/3/2017 6:18 PM CT OT HEAD CT HEAD 11/17/2016 12:20 PM OT CR LUMBARSPINE XR LUMBAR SPINE AP AND LATERAL 2 OR 3 VIEWS 11/17/2016 11:44 AM OT MR LUMBARSPINE MR LUMBAR SPINE Patient Reports 1 / 1 Tonya Watkins  Report date:12/7/2023 View Order (Report matches exam selected in Patient History pane)     ACC: [id number] EXAM: MR BRAIN ORDERED BY: IMELDA ORDONEZ  PROCEDURE DATE: 12/07/2023    INTERPRETATION: MR of the brain without gadolinium contrast / brief MR stroke brain protocol  CLINICAL INFORMATION: stroke work up [facility] dizziness gait disturbance  TECHNIQUE: Axial FLAIR images and axial diffusion weighted images of the brain were obtained. CONTRAST: None  COMPARISON: CT head stroke code from the preceding day and also MR brain 10/14/2023  FINDINGS:  BRAIN: The brain demonstrates no diffusion restriction to indicate acute infarction. No acute cerebral cortical infarct is found. On FLAIR imaging the brain demonstrates scattered patchy abnormal signal hyperintensity most evident in the periatrial and periventricular white matter of the cerebral hemispheres to suggest ischemic white matter disease. A few additional scattered subcortical and deeper lesions are present. No cerebral cortical lesion is identified. No intracranial hemorrhage is recognized. No mass effect is found in the brain.  CSF SPACES: The ventricles, sulci and basal cisterns appear moderately dilated reflecting diffuse brain volume loss. The lateral ventricles are dilated somewhat more than the sulci. The temporal horns of the lateral ventricles are dilated somewhat less than the remainder. Third ventricle is mildly dilated. History of aqueduct is patent. The fourth ventricle is not dilated.  HEAD AND NECK STRUCTURES: The orbits are unremarkable. Paranasal sinuses are clear. The nasal cavity appears intact. The central skull base appears intact. The nasopharynx is symmetric. The temporal bones appear clear of disease. The calvarium appears unremarkable.   IMPRESSION: No evidence of infarction. No adverse interval change 10/14/2023  --- End of Report ---      TONYA WATKINS MD; Attending Radiologist This document has been electronically signed. Dec 7 2023 2:17PM

## 2023-12-20 NOTE — ASSESSMENT
[FreeTextEntry1] : CTA head with contrast reviewed by Dr. Tay with patient demonstrates 1.7 mm L ICA aneurysm. We reviewed natural history of aneurysms including risk of aneurysm rupture and potential risk factors. We discussed various treatment recommendations for aneurysm including endovascular embolization, clip ligation or continued imaging surveillance with annual MRA head. Due to small size of aneurysm, recommend annual MRA head without contrast (November/December 2024).  After MRA head without contrast, RTO To review imaging with Dr. Tay  CT head without contrast demonstrated ventriculomegaly, concerning for NPH Education provided regarding Normal Pressure Hydrocephalus. Diagnostic workup for NPH includes obtaining MRI brain with CSF flow study, neuropsychological evaluation, video gait evaluation, and high volume lumbar puncture. Neuropsychological testing and video gait training is done at baseline prior to high volume LP and evaluation is performed after LP to assess for improvement in gait disturbance and memory impairment. Ms. Guerrero does not appear to be debilitated by NPH symptoms and gait is not characteristic of NPH Will hold off on further workup for NPH If symptoms arise, she was advised to notify office  Patient verbalizes agreement and understanding with plan of care.  I, Dr. Tay, personally performed the evaluation and management (E/M) services for this new patient.  That E/M includes conducting the initial examination, assessing all conditions, and establishing the plan of care.  Today, my ACP, Callie Krishnan, was here to observe my evaluation and management services for this patient to be followed going forward.

## 2023-12-20 NOTE — HISTORY OF PRESENT ILLNESS
[de-identified] : 78 year old woman with PMH of long COVID following 4th Moderna vaccine, who initially presented to [facility] on 12/7/23 with worsening dizziness and unsteady gait. Patient reports for the past 1 month she has been experiencing increased dizziness and feeling unsteady while walking. She underwent a brain MRI 1 month ago which was "normal" per patient. She was walking yesterday evening in the [facility] when she became very dizzy and felt as though "she was taking up the whole sidewalk while walking". She denies a room spinning sensation. She also endorses occasional ?binocular diplopia, that she is able to "get rid of her own". On evaluation at [facility], she states the doctor said she was leaning to the left and had a wide based gait. Imaging performed at [facility] negative except for a 1.7mm L supraclinoid ICA seen on CTA.  She denies any headaches. MRI completed and negative for any acute infarct.   CTA head demonstrated a 1.7 mm L ICA aneurysm and she was recommended outpatient follow up.  She will also follow up with her own neurologist given CTH findings showing ventriculomegaly and possible NPH as the patient has been experiencing some urinary urgency and mild neurologic symptoms.  At today's visit, she denies headaches, dizziness, nausea/vomiting. She states she feels she does have a wide based gait but denies imbalance and falls. Reports urinary frequency/urgency. She states she sees a neurologist for LONG covid symptoms, Dr. Soham Campbell, and coincidentally saw Dr. Campbell this morning. She informed him about the results of the CTA and CT. He feels that she does not have normal pressure hydrocephalus, per the patient.  Denies family history of aneurysm. She is a former smoker.

## 2024-05-01 NOTE — ED PROVIDER NOTE - DIAGNOSTIC INTERPRETATION
Called patient to schedule gamma knife treatments. Earliest planning MRI, consult, and mask available is 5/15 from 8:30am-10:30am with Treatments starting 5/16. Tim has infusion that afternoon, and refused to add appointments. I will look to the next available MRI, and call back with a new game plan. I can be reached at [phone].    Thank you,  Celestino  
xray foot, right, 3 view:  comminuted, proximal 5th metatarsal fracture, non-displaced, no soft tissue swelling

## 2024-09-24 ENCOUNTER — OUTPATIENT (OUTPATIENT)
Dept: OUTPATIENT SERVICES | Facility: HOSPITAL | Age: 79
LOS: 1 days | End: 2024-09-24

## 2024-09-24 ENCOUNTER — APPOINTMENT (OUTPATIENT)
Dept: MRI IMAGING | Facility: CLINIC | Age: 79
End: 2024-09-24
Payer: MEDICARE

## 2024-09-24 DIAGNOSIS — Z90.49 ACQUIRED ABSENCE OF OTHER SPECIFIED PARTS OF DIGESTIVE TRACT: Chronic | ICD-10-CM

## 2024-09-24 DIAGNOSIS — Z98.890 OTHER SPECIFIED POSTPROCEDURAL STATES: Chronic | ICD-10-CM

## 2024-09-24 PROCEDURE — 70551 MRI BRAIN STEM W/O DYE: CPT | Mod: 26,MH

## 2024-09-27 ENCOUNTER — APPOINTMENT (OUTPATIENT)
Dept: MAMMOGRAPHY | Facility: CLINIC | Age: 79
End: 2024-09-27
Payer: MEDICARE

## 2024-09-27 ENCOUNTER — OUTPATIENT (OUTPATIENT)
Dept: OUTPATIENT SERVICES | Facility: HOSPITAL | Age: 79
LOS: 1 days | End: 2024-09-27

## 2024-09-27 DIAGNOSIS — Z90.49 ACQUIRED ABSENCE OF OTHER SPECIFIED PARTS OF DIGESTIVE TRACT: Chronic | ICD-10-CM

## 2024-09-27 DIAGNOSIS — Z98.890 OTHER SPECIFIED POSTPROCEDURAL STATES: Chronic | ICD-10-CM

## 2024-09-27 PROCEDURE — 77067 SCR MAMMO BI INCL CAD: CPT | Mod: 26

## 2024-09-27 PROCEDURE — 77063 BREAST TOMOSYNTHESIS BI: CPT | Mod: 26

## 2024-10-22 NOTE — CONSULT NOTE ADULT - TIME-BASED BILLING (NON-CRITICAL CARE)
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  [facility]  10/26/16 RTLH and BS 3/9/17  Pap 9/2023 - negative with BV  Returns for surveillance exam.  Feels well. No complaints.  Denies bleeding, pain or other pelvic symptoms.  Reports breast exam and mammogram normal with PMD in Nov 2022.   Recently developed palpitations (afib?). Has implanted monitor of some kind and is on Eliquis.  Time-based billing (NON-critical care)

## 2024-10-23 ENCOUNTER — APPOINTMENT (OUTPATIENT)
Dept: RADIOLOGY | Facility: CLINIC | Age: 79
End: 2024-10-23
Payer: MEDICARE

## 2024-10-23 ENCOUNTER — OUTPATIENT (OUTPATIENT)
Dept: OUTPATIENT SERVICES | Facility: HOSPITAL | Age: 79
LOS: 1 days | End: 2024-10-23

## 2024-10-23 DIAGNOSIS — Z98.890 OTHER SPECIFIED POSTPROCEDURAL STATES: Chronic | ICD-10-CM

## 2024-10-23 DIAGNOSIS — Z90.49 ACQUIRED ABSENCE OF OTHER SPECIFIED PARTS OF DIGESTIVE TRACT: Chronic | ICD-10-CM

## 2024-10-23 PROCEDURE — 77080 DXA BONE DENSITY AXIAL: CPT | Mod: 26

## 2025-05-01 ENCOUNTER — APPOINTMENT (OUTPATIENT)
Dept: ORTHOPEDIC SURGERY | Facility: CLINIC | Age: 80
End: 2025-05-01
Payer: MEDICARE

## 2025-05-01 DIAGNOSIS — M25.561 PAIN IN RIGHT KNEE: ICD-10-CM

## 2025-05-01 DIAGNOSIS — M25.562 PAIN IN RIGHT KNEE: ICD-10-CM

## 2025-05-01 PROCEDURE — 99203 OFFICE O/P NEW LOW 30 MIN: CPT

## 2025-05-22 NOTE — PHYSICAL THERAPY INITIAL EVALUATION ADULT - ADDITIONAL COMMENTS
13 Pt currently resides w/  in elevator apt, no KAVYA. Has 1 FOS inside apartment. Primarily amb indep w/o AD. Experienced 1 fall within past 2 months 2/2 tripping. Indep w/ showering and dressing tasks prior to [facility] admission. Patient is Left hand dominant. Pt currently resides w/  in elevator apt, no KAVYA. Has 1 FOS inside apartment. Primarily amb indep w/o AD. Experienced 1 fall within past 2 months 2/2 tripping. Indep w/ showering and dressing tasks prior to [facility] admission. Patient is Left hand dominant.

## 2025-05-30 ENCOUNTER — EMERGENCY (EMERGENCY)
Facility: HOSPITAL | Age: 80
LOS: 1 days | End: 2025-05-30
Attending: EMERGENCY MEDICINE | Admitting: EMERGENCY MEDICINE
Payer: MEDICARE

## 2025-05-30 VITALS
HEIGHT: 65 IN | WEIGHT: 164.02 LBS | DIASTOLIC BLOOD PRESSURE: 83 MMHG | TEMPERATURE: 98 F | OXYGEN SATURATION: 98 % | HEART RATE: 109 BPM | RESPIRATION RATE: 18 BRPM | SYSTOLIC BLOOD PRESSURE: 177 MMHG

## 2025-05-30 VITALS
RESPIRATION RATE: 16 BRPM | OXYGEN SATURATION: 99 % | HEART RATE: 84 BPM | TEMPERATURE: 98 F | DIASTOLIC BLOOD PRESSURE: 75 MMHG | SYSTOLIC BLOOD PRESSURE: 148 MMHG

## 2025-05-30 DIAGNOSIS — Z90.49 ACQUIRED ABSENCE OF OTHER SPECIFIED PARTS OF DIGESTIVE TRACT: Chronic | ICD-10-CM

## 2025-05-30 DIAGNOSIS — Z98.890 OTHER SPECIFIED POSTPROCEDURAL STATES: Chronic | ICD-10-CM

## 2025-05-30 DIAGNOSIS — R06.02 SHORTNESS OF BREATH: ICD-10-CM

## 2025-05-30 DIAGNOSIS — T78.40XA ALLERGY, UNSPECIFIED, INITIAL ENCOUNTER: ICD-10-CM

## 2025-05-30 DIAGNOSIS — Z87.19 PERSONAL HISTORY OF OTHER DISEASES OF THE DIGESTIVE SYSTEM: ICD-10-CM
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
## 2025-05-30 DIAGNOSIS — D89.40 MAST CELL ACTIVATION, UNSPECIFIED: ICD-10-CM

## 2025-05-30 PROCEDURE — 99284 EMERGENCY DEPT VISIT MOD MDM: CPT | Mod: FS

## 2025-05-30 RX ORDER — DIPHENHYDRAMINE HCL 12.5MG/5ML
25 ELIXIR ORAL ONCE
Refills: 0 | Status: COMPLETED | OUTPATIENT
Start: 2025-05-30 | End: 2025-05-30

## 2025-05-30 RX ORDER — PREDNISONE 20 MG/1
1 TABLET ORAL
Qty: 4 | Refills: 0
Start: 2025-05-30 | End: 2025-06-02

## 2025-05-30 RX ORDER — METHYLPREDNISOLONE ACETATE 80 MG/ML
125 INJECTION, SUSPENSION INTRA-ARTICULAR; INTRALESIONAL; INTRAMUSCULAR; SOFT TISSUE ONCE
Refills: 0 | Status: COMPLETED | OUTPATIENT
Start: 2025-05-30 | End: 2025-05-30

## 2025-05-30 RX ADMIN — Medication 20 MILLIGRAM(S): at 09:32

## 2025-05-30 RX ADMIN — Medication 1000 MILLILITER(S): at 09:32

## 2025-05-30 RX ADMIN — METHYLPREDNISOLONE ACETATE 125 MILLIGRAM(S): 80 INJECTION, SUSPENSION INTRA-ARTICULAR; INTRALESIONAL; INTRAMUSCULAR; SOFT TISSUE at 09:32

## 2025-05-30 RX ADMIN — Medication 25 MILLIGRAM(S): at 09:34

## 2025-05-30 NOTE — ED PROVIDER NOTE - OBJECTIVE STATEMENT
79-year-old female with past medical history of mast cell activation syndrome, long COVID and pancreatitis presents to the ED for evaluation of suspected allergic reaction.  Patient states she began to feel hot, noted redness to her face which spread to her arms and chest.  She also endorses mild shortness of breath.  She states she just started her new B vitamin supplement and had blackberries for breakfast.  Otherwise she denies other new exposures.  She did not take any medication to improve her symptoms.  She denies other complaints. Pt denies fever, chills, nausea, vomiting, abdominal pain, diarrhea, headache, dizziness, weakness, chest pain, back pain, LOC, trauma, urinary symptoms, cough, calf pain/swelling, recent travel, recent surgery.

## 2025-05-30 NOTE — ED PROVIDER NOTE - CLINICAL SUMMARY MEDICAL DECISION MAKING FREE TEXT BOX
79-year-old female with past medical history of mast cell activation syndrome, long COVID and pancreatitis presents to the ED for evaluation of suspected allergic reaction.  Patient states she began to feel hot, noted redness to her face which spread to her arms and chest.  She also endorses mild shortness of breath.  She states she just started her new B vitamin supplement and had blackberries for breakfast.  Otherwise she denies other new exposures.  She did not take any medication to improve her symptoms.  She denies other complaints. Initial vitals notable for tachycardia and elevated BP.  On exam patient is in no acute distress, has diffuse erythematous rash, no wheezing or oral swelling.  Patient given fluids Benadryl Pepcid and Solu-Medrol with complete resolution of symptoms.  Sent 4 more days of prednisone as well as EpiPen.  Patient instructed to follow-up with allergist, strict return precautions discussed at bedside, patient comfortable with DC.

## 2025-05-30 NOTE — ED PROVIDER NOTE - CARE PROVIDER_API CALL
Thom Rangel.  Allergy and Immunology  [address] [place] [address], [place] [address]  Phone: [phone]  Fax: [phone]  Follow Up Time: 4-6 Days

## 2025-05-30 NOTE — ED PROVIDER NOTE - PATIENT PORTAL LINK FT
You can access the FollowMyHealth Patient Portal offered by [facility] by registering at the following website: [email or web address]. By joining [facility]’s FollowMyHealth portal, you will also be able to view your health information using other applications (apps) compatible with our system.

## 2025-05-30 NOTE — ED ADULT TRIAGE NOTE - CHIEF COMPLAINT QUOTE
mast cell activation syndrome took a new supplement did not use her epi, face is red and pt feels short of breath

## 2025-06-04 ENCOUNTER — EMERGENCY (EMERGENCY)
Facility: HOSPITAL | Age: 80
LOS: 1 days | End: 2025-06-04
Admitting: EMERGENCY MEDICINE
Payer: MEDICARE

## 2025-06-04 VITALS
HEIGHT: 65 IN | OXYGEN SATURATION: 97 % | WEIGHT: 132.28 LBS | RESPIRATION RATE: 15 BRPM | TEMPERATURE: 99 F | SYSTOLIC BLOOD PRESSURE: 162 MMHG | HEART RATE: 69 BPM | DIASTOLIC BLOOD PRESSURE: 80 MMHG

## 2025-06-04 DIAGNOSIS — Z90.49 ACQUIRED ABSENCE OF OTHER SPECIFIED PARTS OF DIGESTIVE TRACT: Chronic | ICD-10-CM

## 2025-06-04 DIAGNOSIS — Z98.890 OTHER SPECIFIED POSTPROCEDURAL STATES: Chronic | ICD-10-CM

## 2025-06-04 PROCEDURE — L9991: CPT

## 2025-06-04 NOTE — ED ADULT NURSE NOTE - EXPLANATION OF PATIENT'S REASON FOR LEAVING
pt states she feels fine and will follow with her dr per her instructions from visit earlier this week

## 2025-06-04 NOTE — ED ADULT TRIAGE NOTE - CHIEF COMPLAINT QUOTE
patient here with red rash to arms and chest after taking L-Carnitine; has had reactions before after taking supplements such as NAC; hx mast cell activation; has allergist appointment in three days; took zyrtec this morning; no angioedema or throat discomfort noted

## 2025-06-05 DIAGNOSIS — R21 RASH AND OTHER NONSPECIFIC SKIN ERUPTION: ICD-10-CM

## 2025-06-05 DIAGNOSIS — Z53.21 PROCEDURE AND TREATMENT NOT CARRIED OUT DUE TO PATIENT LEAVING PRIOR TO BEING SEEN BY HEALTH CARE PROVIDER: ICD-10-CM

## 2025-06-26 NOTE — ED ADULT NURSE NOTE - NSICDXNOFAMILYHX_GEN_ALL_CORE
Caller: [facility]    Relationship: Home Health    Best call back number: [phone]     What orders are you requesting (i.e. lab or imaging): CONTINUED VERBAL ORDERS    In what timeframe would the patient need to come in: WEEK OF 06/30/25    Where will you receive your lab/imaging services: PATIENT HOME    Additional notes: PATIENT IS NEEDING CONTINUED SKILLED NURSING SERVICES FOR ONCE WEEKLY FOR 4 WEEKS     PLEASE CALL TO ADVISE.       
Papers in clyde box   
<-- Click to add NO pertinent Family History

## 2025-07-01 ENCOUNTER — APPOINTMENT (OUTPATIENT)
Dept: CT IMAGING | Facility: CLINIC | Age: 80
End: 2025-07-01
Payer: MEDICARE

## 2025-07-01 ENCOUNTER — OUTPATIENT (OUTPATIENT)
Dept: OUTPATIENT SERVICES | Facility: HOSPITAL | Age: 80
LOS: 1 days | End: 2025-07-01

## 2025-07-01 DIAGNOSIS — Z98.890 OTHER SPECIFIED POSTPROCEDURAL STATES: Chronic | ICD-10-CM

## 2025-07-01 DIAGNOSIS — Z90.49 ACQUIRED ABSENCE OF OTHER SPECIFIED PARTS OF DIGESTIVE TRACT: Chronic | ICD-10-CM

## 2025-07-01 PROCEDURE — 71250 CT THORAX DX C-: CPT | Mod: 26

## 2025-07-18 ENCOUNTER — APPOINTMENT (OUTPATIENT)
Dept: RADIOLOGY | Facility: CLINIC | Age: 80
End: 2025-07-18
Payer: MEDICARE

## 2025-07-18 ENCOUNTER — OUTPATIENT (OUTPATIENT)
Dept: OUTPATIENT SERVICES | Facility: HOSPITAL | Age: 80
LOS: 1 days | End: 2025-07-18

## 2025-07-18 DIAGNOSIS — Z98.890 OTHER SPECIFIED POSTPROCEDURAL STATES: Chronic | ICD-10-CM

## 2025-07-18 DIAGNOSIS — Z90.49 ACQUIRED ABSENCE OF OTHER SPECIFIED PARTS OF DIGESTIVE TRACT: Chronic | ICD-10-CM

## 2025-07-18 PROCEDURE — 77080 DXA BONE DENSITY AXIAL: CPT | Mod: 26

## 2025-09-10 ENCOUNTER — APPOINTMENT (OUTPATIENT)
Dept: MRI IMAGING | Facility: CLINIC | Age: 80
End: 2025-09-10
Payer: MEDICARE

## 2025-09-10 ENCOUNTER — APPOINTMENT (OUTPATIENT)
Dept: MAMMOGRAPHY | Facility: CLINIC | Age: 80
End: 2025-09-10
Payer: MEDICARE

## 2025-09-10 PROCEDURE — 77063 BREAST TOMOSYNTHESIS BI: CPT | Mod: 26

## 2025-09-10 PROCEDURE — 73721 MRI JNT OF LWR EXTRE W/O DYE: CPT | Mod: 26,RT,77

## 2025-09-10 PROCEDURE — 73721 MRI JNT OF LWR EXTRE W/O DYE: CPT | Mod: 26,LT

## 2025-09-10 PROCEDURE — 77067 SCR MAMMO BI INCL CAD: CPT | Mod: 26
